# Patient Record
Sex: FEMALE | Race: WHITE | Employment: OTHER | URBAN - METROPOLITAN AREA
[De-identification: names, ages, dates, MRNs, and addresses within clinical notes are randomized per-mention and may not be internally consistent; named-entity substitution may affect disease eponyms.]

---

## 2020-03-20 ENCOUNTER — HOSPITAL ENCOUNTER (OUTPATIENT)
Dept: CT IMAGING | Age: 65
Discharge: HOME | End: 2020-03-20
Attending: INTERNAL MEDICINE
Payer: MEDICARE

## 2020-03-20 VITALS — DIASTOLIC BLOOD PRESSURE: 77 MMHG | SYSTOLIC BLOOD PRESSURE: 134 MMHG | HEART RATE: 62 BPM

## 2020-03-20 DIAGNOSIS — R07.9 CHEST PAIN, UNSPECIFIED: ICD-10-CM

## 2020-03-20 DIAGNOSIS — R06.09 DYSPNEA ON EXERTION: ICD-10-CM

## 2020-03-20 LAB — CREAT BLD-MCNC: 0.8 MG/DL (ref 0.6–1.3)

## 2020-03-20 PROCEDURE — 74011250637 HC RX REV CODE- 250/637: Performed by: INTERNAL MEDICINE

## 2020-03-20 PROCEDURE — 82565 ASSAY OF CREATININE: CPT

## 2020-03-20 PROCEDURE — 74011636320 HC RX REV CODE- 636/320: Performed by: INTERNAL MEDICINE

## 2020-03-20 PROCEDURE — 74011250636 HC RX REV CODE- 250/636: Performed by: INTERNAL MEDICINE

## 2020-03-20 PROCEDURE — 75574 CT ANGIO HRT W/3D IMAGE: CPT

## 2020-03-20 RX ORDER — NITROGLYCERIN 0.4 MG/1
0.4 TABLET SUBLINGUAL AS NEEDED
Status: DISCONTINUED | OUTPATIENT
Start: 2020-03-20 | End: 2020-03-24 | Stop reason: HOSPADM

## 2020-03-20 RX ORDER — SODIUM CHLORIDE 9 MG/ML
50 INJECTION, SOLUTION INTRAVENOUS ONCE
Status: COMPLETED | OUTPATIENT
Start: 2020-03-20 | End: 2020-03-20

## 2020-03-20 RX ORDER — SODIUM CHLORIDE 0.9 % (FLUSH) 0.9 %
10 SYRINGE (ML) INJECTION ONCE
Status: COMPLETED | OUTPATIENT
Start: 2020-03-20 | End: 2020-03-20

## 2020-03-20 RX ORDER — IODIXANOL 320 MG/ML
100 INJECTION, SOLUTION INTRAVASCULAR ONCE
Status: COMPLETED | OUTPATIENT
Start: 2020-03-20 | End: 2020-03-20

## 2020-03-20 RX ADMIN — IODIXANOL 100 ML: 320 INJECTION, SOLUTION INTRAVASCULAR at 08:23

## 2020-03-20 RX ADMIN — SODIUM CHLORIDE 250 ML: 900 INJECTION, SOLUTION INTRAVENOUS at 08:23

## 2020-03-20 RX ADMIN — NITROGLYCERIN 0.4 MG: 0.4 TABLET SUBLINGUAL at 08:12

## 2020-03-20 RX ADMIN — Medication 10 ML: at 08:23

## 2020-03-20 NOTE — PROGRESS NOTES
0800- Pt to CT room. IV SL established by Olimpia Valdez, RT. Pt tolerated well. Pt took beta blocker PTA. HR-64    0805- CT scan begins. 3567- NTG given. IV patent. No complaints. HR-60    0820- CT scan complete. Pt tolerated procedure well. 0825- IV D/C'd intact without problem. D/C instructions reviewed with pt and copy given. Verbalized understanding. D/C'd amb, stable, NAD.

## 2020-04-03 NOTE — PROCEDURES
[address] CATH    Name:  Tiffani Vasques  MR#:  [id number]  :  1955  ACCOUNT #:  [de-identified]  DATE OF SERVICE:  2020    PROCEDURES PERFORMED:  Coronary CTA. The extracardiac portion of the test is dictated separately by Radiology Services. CORONARY ANATOMY:  1. The left main coronary artery rises normally from sinus of Valsalva. This is no significant atherosclerotic disease in the left main. 2.  The LAD is a moderate-sized vessel, which extends around the apex. The LAD gives off a small-to-moderate size diagonal branch followed by a small diagonal branch with no significant atherosclerotic disease seen in the LAD system. 3.  The circumflex is a moderate-sized nondominant vessel, which gives off the moderate-to-large size obtuse marginal branch and the distal circumflex gives off a small-to-moderate size posterolateral branch. There is 40% atherosclerotic disease in the obtuse marginal branch, noncalcified. 4.  The right coronary artery is a small-to-moderate sized dominant vessel, which gives off a small posterolateral branch and moderate size PDA branch. There is no significant atherosclerotic disease seen in the right coronary artery system. LEFT VENTRICULAR ANALYSIS:  The left ventricle is normal in size. The left ventricular myocardium appeared normal.  There were no regional wall motion abnormalities. Left ventricular ejection fraction is calculated to be 64%. CONCLUSION:  1. Nonsignificant atherosclerotic disease seen in the circumflex system, noncalcified. 2.  Right coronary artery dominant system. 3.  Normal left ventricular analysis with no regional wall motion abnormalities and a left ventricular ejection fraction is calculated to be 64%.         Tres Marsh MD      [id number]  D:  2020 20:25  T:  2020 21:08  JOB #:  [id number]  CC:  Elie Scott MD

## 2021-02-19 ENCOUNTER — HOSPITAL ENCOUNTER (OUTPATIENT)
Dept: LAB | Age: 66
Discharge: HOME | End: 2021-02-19
Payer: MEDICARE

## 2021-02-19 ENCOUNTER — TRANSCRIBE ORDER (OUTPATIENT)
Dept: REGISTRATION | Age: 66
End: 2021-02-19

## 2021-02-19 DIAGNOSIS — Z01.812 PRE-PROCEDURAL LABORATORY EXAMINATIONS: Primary | ICD-10-CM

## 2021-02-19 DIAGNOSIS — Z01.812 PRE-PROCEDURAL LABORATORY EXAMINATIONS: ICD-10-CM

## 2021-02-19 PROCEDURE — U0003 INFECTIOUS AGENT DETECTION BY NUCLEIC ACID (DNA OR RNA); SEVERE ACUTE RESPIRATORY SYNDROME CORONAVIRUS 2 (SARS-COV-2) (CORONAVIRUS DISEASE [COVID-19]), AMPLIFIED PROBE TECHNIQUE, MAKING USE OF HIGH THROUGHPUT TECHNOLOGIES AS DESCRIBED BY CMS-2020-01-R: HCPCS

## 2021-02-20 LAB — SARS-COV-2, COV2NT: NOT DETECTED

## 2021-02-22 RX ORDER — MELATONIN
1000 DAILY
COMMUNITY

## 2021-02-22 RX ORDER — HYDROXYZINE 25 MG/1
25 TABLET, FILM COATED ORAL
COMMUNITY

## 2021-02-22 RX ORDER — GLUCOSAM/CHONDRO/HERB 149/HYAL 750-100 MG
1 TABLET ORAL DAILY
Status: ON HOLD | COMMUNITY
End: 2021-09-23

## 2021-02-22 RX ORDER — ASCORBIC ACID 500 MG
1000 TABLET ORAL DAILY
COMMUNITY

## 2021-02-22 NOTE — PERIOP NOTES
[address]  Endoscopy Preprocedure Instructions      1. On the day of your surgery, please report to registration located on the 2nd floor of the  [facility]. yes    2. You must have a responsible adult to drive you to the hospital, stay at the hospital during your procedure and drive you home. If they leave your procedure will not be started (no exceptions). yes    3. Do not have anything to eat or drink (including water, gum, mints, coffee, and juice) after midnight. This does not apply to the medications you were instructed to take by your physician.yes. If you are currently taking Plavix, Coumadin, Aspirin, or other blood-thinning agents, contact your physician for special instructions. not applicable. 4. If you are having a procedure that requires bowel prep: We recommend that you have only clear liquids the day before your procedure and begin your bowel prep by 5:00 pm.  You may continue to drink clear liquids until midnight. If for any reason you are not able to complete your prep please notify your physician immediately. yes    5. Have a list of all current medications, including vitamins, herbal supplements and any other over the counter medications. yes    6. If you wear glasses, contacts, dentures and/or hearing aids, they may be removed prior to procedure, please bring a case to store them in. yes    7. You should understand that if you do not follow these instructions your procedure may be cancelled. If your physical condition changes (I.e. fever, cold or flu) please contact your doctor as soon as possible. 8. It is important that you be on time.   If for any reason you are unable to keep your appointment please call [phone] the day of or your physicians office prior to your scheduled procedure

## 2021-02-23 ENCOUNTER — HOSPITAL ENCOUNTER (OUTPATIENT)
Age: 66
Setting detail: OUTPATIENT SURGERY
Discharge: HOME | End: 2021-02-23
Attending: SPECIALIST | Admitting: SPECIALIST
Payer: MEDICARE

## 2021-02-23 ENCOUNTER — ANESTHESIA (OUTPATIENT)
Dept: ENDOSCOPY | Age: 66
End: 2021-02-23
Payer: MEDICARE

## 2021-02-23 ENCOUNTER — ANESTHESIA EVENT (OUTPATIENT)
Dept: ENDOSCOPY | Age: 66
End: 2021-02-23
Payer: MEDICARE

## 2021-02-23 VITALS
BODY MASS INDEX: 22 KG/M2 | RESPIRATION RATE: 17 BRPM | WEIGHT: 136.91 LBS | HEART RATE: 63 BPM | OXYGEN SATURATION: 96 % | DIASTOLIC BLOOD PRESSURE: 63 MMHG | TEMPERATURE: 97.5 F | HEIGHT: 66 IN | SYSTOLIC BLOOD PRESSURE: 111 MMHG

## 2021-02-23 PROCEDURE — 76040000019: Performed by: SPECIALIST

## 2021-02-23 PROCEDURE — 2709999900 HC NON-CHARGEABLE SUPPLY: Performed by: SPECIALIST

## 2021-02-23 PROCEDURE — 74011250636 HC RX REV CODE- 250/636: Performed by: NURSE ANESTHETIST, CERTIFIED REGISTERED

## 2021-02-23 PROCEDURE — 88305 TISSUE EXAM BY PATHOLOGIST: CPT

## 2021-02-23 PROCEDURE — 76060000031 HC ANESTHESIA FIRST 0.5 HR: Performed by: SPECIALIST

## 2021-02-23 PROCEDURE — 77030021593 HC FCPS BIOP ENDOSC BSC -A: Performed by: SPECIALIST

## 2021-02-23 PROCEDURE — 77030013992 HC SNR POLYP ENDOSC BSC -B: Performed by: SPECIALIST

## 2021-02-23 RX ORDER — PROPOFOL 10 MG/ML
INJECTION, EMULSION INTRAVENOUS AS NEEDED
Status: DISCONTINUED | OUTPATIENT
Start: 2021-02-23 | End: 2021-02-23 | Stop reason: HOSPADM

## 2021-02-23 RX ORDER — SODIUM CHLORIDE 9 MG/ML
INJECTION, SOLUTION INTRAVENOUS
Status: DISCONTINUED | OUTPATIENT
Start: 2021-02-23 | End: 2021-02-23 | Stop reason: HOSPADM

## 2021-02-23 RX ORDER — MIDAZOLAM HYDROCHLORIDE 1 MG/ML
.25-5 INJECTION, SOLUTION INTRAMUSCULAR; INTRAVENOUS AS NEEDED
Status: DISCONTINUED | OUTPATIENT
Start: 2021-02-23 | End: 2021-02-23 | Stop reason: HOSPADM

## 2021-02-23 RX ORDER — FENTANYL CITRATE 50 UG/ML
25 INJECTION, SOLUTION INTRAMUSCULAR; INTRAVENOUS AS NEEDED
Status: DISCONTINUED | OUTPATIENT
Start: 2021-02-23 | End: 2021-02-23 | Stop reason: HOSPADM

## 2021-02-23 RX ORDER — DEXTROMETHORPHAN/PSEUDOEPHED 2.5-7.5/.8
1.2 DROPS ORAL
Status: DISCONTINUED | OUTPATIENT
Start: 2021-02-23 | End: 2021-02-23 | Stop reason: HOSPADM

## 2021-02-23 RX ORDER — NALOXONE HYDROCHLORIDE 0.4 MG/ML
0.4 INJECTION, SOLUTION INTRAMUSCULAR; INTRAVENOUS; SUBCUTANEOUS
Status: DISCONTINUED | OUTPATIENT
Start: 2021-02-23 | End: 2021-02-23 | Stop reason: HOSPADM

## 2021-02-23 RX ORDER — FLUMAZENIL 0.1 MG/ML
0.2 INJECTION INTRAVENOUS
Status: DISCONTINUED | OUTPATIENT
Start: 2021-02-23 | End: 2021-02-23 | Stop reason: HOSPADM

## 2021-02-23 RX ORDER — SODIUM CHLORIDE 9 MG/ML
50 INJECTION, SOLUTION INTRAVENOUS CONTINUOUS
Status: DISCONTINUED | OUTPATIENT
Start: 2021-02-23 | End: 2021-02-23 | Stop reason: HOSPADM

## 2021-02-23 RX ADMIN — PROPOFOL 50 MG: 10 INJECTION, EMULSION INTRAVENOUS at 07:33

## 2021-02-23 RX ADMIN — PROPOFOL 50 MG: 10 INJECTION, EMULSION INTRAVENOUS at 07:39

## 2021-02-23 RX ADMIN — PROPOFOL 80 MG: 10 INJECTION, EMULSION INTRAVENOUS at 07:28

## 2021-02-23 RX ADMIN — SODIUM CHLORIDE: 9 INJECTION, SOLUTION INTRAVENOUS at 07:21

## 2021-02-23 NOTE — PROGRESS NOTES
Milka Mccullough  1955  [id number]    Situation:  Verbal report received from:    Elmira Wilkins RN   Procedure: Procedure(s):  COLONOSCOPY  COLON BIOPSY  POLYPECTOMY    Background:    Preoperative diagnosis: RECTAL HEMORRHAGE  SCREENING FOR MALIGNANT NEOPLASMS OF COLON  Postoperative diagnosis: 1. rectal mass  2. rectal polyp    :  Dr. Elizabeth Carrizales   Assistant(s): Endoscopy Technician-1: Howie Pritchard  Endoscopy RN-1: Kahlil Avelar    Specimens:   ID Type Source Tests Collected by Time Destination   1 : Polyp Preservative Rectum  Gera Craig MD 2/23/2021 4864 Pathology   2 : Rectal Mass Biopsy Preservative   Gera Craig MD 2/23/2021 9255 Pathology     H. Pylori  no    Assessment:  Intra-procedure medications       Anesthesia gave intra-procedure sedation and medications, see anesthesia flow sheet yes    Intravenous fluids: NS@ KVO     Vital signs stable   yes    Abdominal assessment: round and soft   yes    Recommendation:  Discharge patient per MD order  yes.   Return to floor  outpatient  Family or Friend   Daughter   Permission to share finding with family or friend yes

## 2021-02-23 NOTE — PROGRESS NOTES
Anesthesia staff at patient's bedside administering anesthesia and monitoring patients vital signs throughout procedure. See anesthesia note. ABD remains soft and non-tender post procedure. Pt has no complaints at this time and tolerated the procedure well. Endoscope was pre-cleaned at bedside immediately following procedure by Kelly Roy.

## 2021-02-23 NOTE — INTERVAL H&P NOTE
Pre-Endoscopy H&P Update  Chief complaint/HPI/ROS:  The indication for the procedure, the patient's history and the patient's current medications are reviewed prior to the procedure and that data is reported on the H&P to which this document is attached. Any significant complaints with regard to organ systems will be noted, and if not mentioned then a review of systems is not contributory. Past Medical History:   Diagnosis Date    Atrial fibrillation ([address]     recently diagnosed in 2021. Ill-defined condition     rectal bleeding. Thromboembolus ([address]     PE in 1999. Past Surgical History:   Procedure Laterality Date    VT BREAST SURGERY PROCEDURE UNLISTED      breast biopsy. Social   Social History     Tobacco Use    Smoking status: Never Smoker    Smokeless tobacco: Never Used   Substance Use Topics    Alcohol use: Never     Frequency: Never      Family History   Problem Relation Age of Onset    Cancer Paternal Uncle     Cancer Mother     Cancer Father       No Known Allergies   Prior to Admission Medications   Prescriptions Last Dose Informant Patient Reported? Taking? OTHER 2/16/2021  Yes Yes   Sig: Take 1 Cap by mouth. Coconut supplement. apixaban (Eliquis) 5 mg tablet 2/6/2021 at Unknown time  Yes No   Sig: Take 5 mg by mouth two (2) times a day. ascorbic acid, vitamin C, (Vitamin C) 500 mg tablet 2/16/2021  Yes Yes   Sig: Take 1,000 mg by mouth daily. cholecalciferol (Vitamin D3) (1000 Units /25 mcg) tablet 2/16/2021  Yes Yes   Sig: Take 1,000 Units by mouth daily. hydrOXYzine HCL (ATARAX) 25 mg tablet Unknown at Unknown time  Yes No   Sig: Take 25 mg by mouth three (3) times daily as needed. omega 3-DHA-EPA-fish oil 1,000 mg (120 mg-180 mg) capsule 2/16/2021  Yes Yes   Sig: Take 1 Cap by mouth daily. Facility-Administered Medications: None       PHYSICAL EXAM:  The patient is examined immediately prior to the procedure.   Visit Vitals  BP (!) 147/75   Pulse 66   Temp 98.3 °F (36.8 °C)   Resp 18   Ht 5' 6\" (1.676 m)   Wt 62.1 kg (136 lb 14.5 oz)   SpO2 99%   Breastfeeding No   BMI 22.10 kg/m²     Gen: Appears comfortable, no distress. Pulm: comfortable respirations with no abnormal audible breath sounds  HEART: well perfused, no abnormal audible heart sounds  GI: abdomen flat. PLAN:  Informed consent discussion held, patient afforded an opportunity to ask questions and all questions answered. After being advised of the risks, benefits, and alternatives, the patient requested that we proceed and indicated so on a written consent form. Will proceed with procedure as planned.   Jami Hernandez MD

## 2021-02-23 NOTE — ANESTHESIA POSTPROCEDURE EVALUATION
Procedure(s):  COLONOSCOPY  COLON BIOPSY  POLYPECTOMY. MAC    Anesthesia Post Evaluation      Multimodal analgesia: multimodal analgesia used between 6 hours prior to anesthesia start to PACU discharge  Patient location during evaluation: PACU  Patient participation: complete - patient participated  Level of consciousness: awake and alert  Pain management: adequate  Airway patency: patent  Anesthetic complications: no  Cardiovascular status: acceptable  Respiratory status: acceptable  Hydration status: acceptable  Post anesthesia nausea and vomiting:  none      INITIAL Post-op Vital signs:   Vitals Value Taken Time   BP 88/50 02/23/21 0754   Temp     Pulse 66 02/23/21 0756   Resp 15 02/23/21 0756   SpO2 98 % 02/23/21 0756   Vitals shown include unvalidated device data.

## 2021-02-23 NOTE — PROGRESS NOTES
Endoscopy discharge instructions have been reviewed and given to patient. The patient verbalized understanding and acceptance of instructions. Dr. Julien Ruvalcaba  discussed with patient's daughter  procedure findings and next steps.

## 2021-02-23 NOTE — ANESTHESIA PREPROCEDURE EVALUATION
Relevant Problems   No relevant active problems       Anesthetic History   No history of anesthetic complications            Review of Systems / Medical History  Patient summary reviewed, nursing notes reviewed and pertinent labs reviewed    Pulmonary  Within defined limits            Pertinent negatives: No COPD, recent URI and sleep apnea     Neuro/Psych   Within defined limits           Cardiovascular            Dysrhythmias : atrial fibrillation    Pertinent negatives: No hypertension    Comments:  Worsened rectal bleeding on anticoagulation, so patient discontinued this    GI/Hepatic/Renal  Within defined limits              Endo/Other  Within defined limits        Pertinent negatives: No diabetes   Other Findings              Physical Exam    Airway  Mallampati: II  TM Distance: 4 - 6 cm  Neck ROM: normal range of motion   Mouth opening: Normal     Cardiovascular  Regular rate and rhythm,  S1 and S2 normal,  no murmur, click, rub, or gallop             Dental    Dentition: Lower dentition intact and Upper dentition intact     Pulmonary  Breath sounds clear to auscultation               Abdominal         Other Findings            Anesthetic Plan    ASA: 2  Anesthesia type: MAC          Induction: Intravenous  Anesthetic plan and risks discussed with: Patient

## 2021-02-23 NOTE — PROCEDURES
[address] [facility] RHONDA Agarwal MD  [phone]      2021    Colonoscopy Procedure Note  Betty Gonzalez  :  1955  [facility] Medical Record Number: [id number]    Indications:     Hematochezia/melena, Screening colonoscopy  PCP:  Julianna Soto MD  Anesthesia/Sedation: Conscious Sedation/Moderate Sedation/GETA, see notes  Endoscopist:  Dr. Jill Cannon  Complications:  None  Estimated Blood Loss:  None    Permit:  The indications, risks, benefits and alternatives were reviewed with the patient or their decision maker who was provided an opportunity to ask questions and all questions were answered. The specific risks of colonoscopy with conscious sedation were reviewed, including but not limited to anesthetic complication, bleeding, adverse drug reaction, missed lesion, infection, IV site reactions, and intestinal perforation which would lead to the need for surgical repair. Alternatives to colonoscopy including radiographic imaging, observation without testing, or laboratory testing were reviewed including the limitations of those alternatives. After considering the options and having all their questions answered, the patient or their decision maker provided both verbal and written consent to proceed. Procedure in Detail:  After obtaining informed consent, positioning of the patient in the left lateral decubitus position, and conduction of a pre-procedure pause or \"time out\" the endoscope was introduced into the anus and advanced to the cecum, which was identified by the ileocecal valve and appendiceal orifice. The quality of the colonic preparation was excellent. A careful inspection was made as the colonoscope was withdrawn, findings and interventions are described below.     Findings:   Palpable rectal mass on digital exam.    Endoscopic exam reveals annular 3cm villiform malignant appearing rectal mass with depressed center which is firm and 'feels' fixed to the rectal wall; located in the rectum from 5-8cm upon the first valve of Alexander. Multiple cold forceps biopsies obtained. Adjacent to the mass is a small 5mm polyp which appears separate from the above mentioned lesion. This is snared off cold (and this is done prior to biopsies so as to try to minimize the likelihood of cross-contamination). Hemostasis from biopsies and polypectomy site confirmed. Specimens:    See above    Complications:   None; patient tolerated the procedure well. Impression:  Rectal polyp and rectal carcinoma. LABS have already been done: CBC is normal hgb 14, CMP reveals eGFR >60, normal liver enzymes and liver function. Recommendations:     - Await pathology. - CT scan will be obtained    - Colorectal surgery consult will be requested. - Medical oncology consult will be requested. - Radial EUS will be requested for local staging information and assistance in planning surgery vs ne-adjuvant therapy. - She is in sinus rhythm today, and given the finding and her history of intermittent bleeding even without anticoagulation I would recommend that be held until such time that her therapeutic plans are generated. Thank you for entrusting me with this patient's care. Please do not hesitate to contact me with any questions or if I can be of assistance with any of your other patients' GI needs. Signed By: Angelica Greenberg MD                        February 23, 2021      Surgical assistant none. Implants none unless specified.

## 2021-02-23 NOTE — H&P
Date: 2021 8:45 AM   Patient Name: José Miguel Escamilla   Account #: [id number]    Gender: Female    (age): 1955 (76)       Provider:     Harlan Way MD        Referring Physician:     Jacquelyn Hair  [facility], [address] [facility] [address]  [phone] (phone)  [phone] (fax)        Chief Complaint: Blood in stool, nausea, Severe Fatigue           History of Present Illness:   Personal history of visible rectal bleeding likely more than a years duration, intermittent. Personal history of syncopal episode several weeks ago; went to the emergency room where head, chest CT scans were without diagnosis; troponin I was normal as was CBC, CMP. Follow-up at cardiology was told atrial fibrillation was a prime suspect, placed on heart monitor, placed on Eliquis; Eliquis markedly increased the amount and frequency of bleeding; she stopped that medication 2 weeks ago. Bleeding has persisted in spite of stopping Eliquis. She has persistent intermittent episodes of dizziness, weakness. Have discussed atrial fibrillation, reluctance to resume Eliquis anticoagulation without knowledge as to the origin of the blood loss; advised of persistent atrial fibrillation has a significant CVA risk. All questions answered. ? Personal history of visible rectal bleeding likely more than a years duration, intermittent. Personal history of syncopal episode several weeks ago; went to the emergency room where head, chest CT scans were without diagnosis; troponin I was normal as was CBC, CMP. Follow-up at cardiology was told atrial fibrillation was a prime suspect, placed on heart monitor, placed on Eliquis; Eliquis markedly increased the amount and frequency of bleeding; she stopped that medication 2 weeks ago. Bleeding has persisted in spite of stopping Eliquis. She has persistent intermittent episodes of dizziness, weakness.  Have discussed atrial fibrillation, reluctance to resume Eliquis anticoagulation without knowledge as to the origin of the blood loss; advised of persistent atrial fibrillation has a significant CVA risk. All questions answered. ? Past Medical History      Medical Conditions: No Known Conditions   Surgical Procedures: No Prior Procedures   Dx Studies: CT Scan, Coronary  Pre-Procedure Call, 11/28/2016   Medications: ascorbic acid (vitamin C) 500 mg Take 1 tablet by mouth once a day  cholecalciferol (vitamin D3) 10 mcg (400 unit) Take 1 tablet by mouth once a day  Fish Oil 120-180 mg Take 1 capsule by mouth once a day   Allergies: Patient has no known allergies or drug allergies   Immunizations: Pneumococcal conjugate PCV 13, 01/01/2020  Influenza, seasonal, injectable (refused)      Social History      Alcohol: None   Tobacco: Never smoker   Drugs: None   Exercise: Exercise less than 3 times a week. Caffeine: Daily. Marital Status:          Occupation:               Family History No history of Esophogeal Cancer, GI Cancers, IBD (Crohn's or UC), Liver disease  Uncle: Diagnosed with Family history of colon cancer;   Sister: Diagnosed with Breast, Family history of colon polyps;   Brother:   Mother: Diagnosed with Pancreatic cancer;       Review of Systems:   Cardiovascular: Denies chest pain, irregular heart beat, palpitations, peripheral edema, syncope, Sweats. Constitutional: Presents suffers from fatigue. Denies fever, loss of appetite, weight gain, weight loss. ENMT: Denies nose bleeds, sore throat, hearing loss. Endocrine: Denies excessive thirst, heat intolerance. Eyes: Denies loss of vision. Gastrointestinal: Presents suffers from constipation, nausea, rectal bleeding. Denies abdominal pain, abdominal swelling, change in bowel habits, diarrhea, Bloating/gas, heartburn, jaundice, stomach cramps, vomiting, dysphagia, rectal pain, Stool incontinence, hematemesis. Genitourinary: Denies dark urine, dysuria, frequent urination, hematuria, incontinence.    Hematologic/Lymphatic: Presents suffers from easy bruising. Denies prolonged bleeding. Integumentary: Denies itching, rashes, sun sensitivity. Musculoskeletal: Denies arthritis, back pain, gout, joint pain, muscle weakness, stiffness. Neurological: Denies dizziness, fainting, frequent headaches, memory loss. Psychiatric: Presents suffers from difficulty sleeping. Denies anxiety, depression, hallucinations, nervousness, panic attacks, paranoia. Respiratory: Denies cough, dyspnea, wheezing. Vital Signs:   BP  (mmHg)  Pulse  (ppm) Weight (lbs/oz) Height (ft/in) BMI   130/86 77 140 /  5 / 6 22.59      Physical Exam:   Constitutional:      Appearance: No distress, appears comfortable. Skin:      Inspection: No rash, no jaundice. Head/face: Inspection: Normacephalic, atraumatic. Eyes:      Conjunctivae/lids: Normal.   ENMT:      External: Normal.   Neck:      Neck: Normal appearance, trachea midline. Respiratory:      Effort: Normal respiratory effort, comfortable, speaks in complete sentences. Auscultation: normal breath sounds, no rubs, wheezes or rhonchi. Cardiovascular: Auscultation: normal, S1 and S2, no gallops , no rubs or murmurs . Gastrointestinal/Abdomen:      Abdomen: non-distended, nontender. Liver/Spleen: normal, normal size, Liver size and consistency normal, spleen is non-palpable. Musculoskeletal:      Gait/station: normal.   Muscles: normal strength and tone, no atrophy or abnormal movements. Psychiatric:      Judgment/insight: Normal, normal judgement, normal insight. Mood and affect: Normal mood, affect full, no evidence of depression, anxiety or agitation. Lymphatic:      Neck: No lymphadenopathy in the cervical or supraclavicular chain. Lab Results: No Electronic Results      Impressions: Rectal hemorrhage? ? Rectal hemorrhage?          Assessment: ?         Plan: Suprep Bowel Prep Kit 17.5-3.13-1.6 gram Take as directed  Colonoscopy  Colonoscopy/Biopsy/Polypectomy: options, risks, benefits and complications of bleeding, tear, surgical repair (1:1000) & 1:100 post Polypectomy, and reaction of medication, aspiration, Pneumonia explained to patient, 5% chance of missing colon cancer and other lesions explained. All questions answered. Education on Colonoscopy Prep? ?Suprep Bowel Prep Kit? ?17.5-3.13-1.6 gram? ?Take as directed? ? ?  ? ? Colonoscopy? ?  ? ? Colonoscopy/Biopsy/Polypectomy: options, risks, benefits and complications of bleeding, tear, surgical repair (1:1000) & 1:100 post Polypectomy, and reaction of medication, aspiration, Pneumonia explained to patient, 5% chance of missing colon cancer and other lesions explained. All questions answered. ? ?  ? ? Education on Colonoscopy Prep? Risk & Medical Necessity: The level of medical decision making for this visit is moderate. The number and complexity of problems addressed are moderate. The amount and/or complexity of data to be reviewed and analyzed is moderate. The risk of complications and/or morbidity or mortality of patient management is low. Notes:              Ana Patel MD     Electronically signed on 2021 9:57:40 AM by MD Aye Oropeza, MRN [id number],  1955 First Visit, 2021                                                                                                                                                        New     Modify          Delete     Delete all     Edit Wording          Sign     [email or web address] No

## 2021-02-23 NOTE — DISCHARGE INSTRUCTIONS
[address] [facility] RHONDA Harris MD  [phone]      February 23, 2021    [address]: 1955    COLONOSCOPY DISCHARGE INSTRUCTIONS    If there is redness at IV site you should apply warm compress to area. If redness or soreness persist contact Dr. Lena Harris' or your primary care doctor. There may be a slight amount of blood passed from the rectum. Gaseous discomfort may develop, but walking, belching will help relieve this. You may not operate a vehicle for 12 hours  You may not operate machinery or dangerous appliances for rest of today  You may not drink alcoholic beverages for 12 hours  Avoid making any critical decisions for 24 hours    DIET:  You may resume your normal diet, but some patients find that heavy or large meals may lead to indigestion or vomiting. I suggest a light meal as first food intake. MEDICATIONS:  The use of some over-the-counter pain medication may lead to bleeding after colon biopsies or polyp removal.  Tylenol (also called acetaminophen) is safe to take even if you have had colonoscopy with polyp removal.  Based on the procedure you had today you may not safely take aspirin or aspirin-like products for the next ten (10) days. Remember that Tylenol (also called acetaminophen) is safe to take after colonoscopy even if you have had biopsies or polyps removed. ACTIVITY:  You may resume your normal household activities, but it is recommended that you spend the remainder of the day resting -  avoid any strenuous activity. CALL DR. Ender Rivera' OFFICE IF:  Increasing pain, nausea, vomiting  Abdominal distension (swelling)  Significant new or increased bleeding (oral or rectal)  Fever/Chills  Chest pain/shortness of breath                       Additional instructions:   No aspirin 10 days. Do not restart anticoagulation until advised to do so by your other doctors.   We discovered what appears to be a cancer in the rectum. I have taken multiple biopsies and will contact you with results. While waiting for results we need to begin additional testing and arrange consultation with an oncologist and a surgeon so that treatment options can be generated. My office will contact you with the scheduling of a CT scan, and endoscopic ultrasound test, and the consults with the additional physicians who will help to determine what to do next about this finding. It was an honor to be your doctor today. Please let me or my office staff know if you have any feedback about today's procedure. Lucie Matias MD    Colonoscopy saves lives, and can prevent colon cancer. Everyone aged 48 or older needs colonoscopy.   Tell your family and friends: get the test!

## 2021-02-24 ENCOUNTER — TRANSCRIBE ORDER (OUTPATIENT)
Dept: SCHEDULING | Age: 66
End: 2021-02-24

## 2021-02-24 DIAGNOSIS — C20 MALIGNANT TUMOR OF RECTUM (HCC): Primary | ICD-10-CM

## 2021-03-01 ENCOUNTER — HOSPITAL ENCOUNTER (OUTPATIENT)
Dept: CT IMAGING | Age: 66
Discharge: HOME | End: 2021-03-01
Attending: SPECIALIST
Payer: MEDICARE

## 2021-03-01 DIAGNOSIS — C20 MALIGNANT TUMOR OF RECTUM (HCC): ICD-10-CM

## 2021-03-01 LAB — CREAT BLD-MCNC: 0.6 MG/DL (ref 0.6–1.3)

## 2021-03-01 PROCEDURE — 74011000636 HC RX REV CODE- 636

## 2021-03-01 PROCEDURE — 82565 ASSAY OF CREATININE: CPT

## 2021-03-01 PROCEDURE — 74177 CT ABD & PELVIS W/CONTRAST: CPT

## 2021-03-01 RX ADMIN — IOPAMIDOL 100 ML: 755 INJECTION, SOLUTION INTRAVENOUS at 15:18

## 2021-09-20 ENCOUNTER — HOSPITAL ENCOUNTER (INPATIENT)
Age: 66
LOS: 3 days | Discharge: HOME HEALTH | DRG: 552 | End: 2021-09-23
Attending: EMERGENCY MEDICINE | Admitting: GENERAL ACUTE CARE HOSPITAL
Payer: MEDICARE

## 2021-09-20 ENCOUNTER — APPOINTMENT (OUTPATIENT)
Dept: MRI IMAGING | Age: 66
DRG: 552 | End: 2021-09-20
Attending: EMERGENCY MEDICINE
Payer: MEDICARE

## 2021-09-20 ENCOUNTER — APPOINTMENT (OUTPATIENT)
Dept: GENERAL RADIOLOGY | Age: 66
DRG: 552 | End: 2021-09-20
Attending: EMERGENCY MEDICINE
Payer: MEDICARE

## 2021-09-20 DIAGNOSIS — M54.9 INTRACTABLE BACK PAIN: Primary | ICD-10-CM

## 2021-09-20 LAB
ANION GAP SERPL CALC-SCNC: 6 MMOL/L (ref 5–15)
BASOPHILS # BLD: 0 K/UL (ref 0–0.1)
BASOPHILS NFR BLD: 0 % (ref 0–1)
BUN SERPL-MCNC: 22 MG/DL (ref 6–20)
BUN/CREAT SERPL: 46 (ref 12–20)
CALCIUM SERPL-MCNC: 8.2 MG/DL (ref 8.5–10.1)
CHLORIDE SERPL-SCNC: 103 MMOL/L (ref 97–108)
CO2 SERPL-SCNC: 25 MMOL/L (ref 21–32)
CREAT SERPL-MCNC: 0.48 MG/DL (ref 0.55–1.02)
DIFFERENTIAL METHOD BLD: ABNORMAL
EOSINOPHIL # BLD: 0 K/UL (ref 0–0.4)
EOSINOPHIL NFR BLD: 0 % (ref 0–7)
ERYTHROCYTE [DISTWIDTH] IN BLOOD BY AUTOMATED COUNT: 15.9 % (ref 11.5–14.5)
GLUCOSE SERPL-MCNC: 162 MG/DL (ref 65–100)
HCT VFR BLD AUTO: 35.2 % (ref 35–47)
HGB BLD-MCNC: 11.7 G/DL (ref 11.5–16)
IMM GRANULOCYTES # BLD AUTO: 0 K/UL (ref 0–0.04)
IMM GRANULOCYTES NFR BLD AUTO: 0 % (ref 0–0.5)
INR PPP: 1 (ref 0.9–1.1)
LYMPHOCYTES # BLD: 0.2 K/UL (ref 0.8–3.5)
LYMPHOCYTES NFR BLD: 16 % (ref 12–49)
MCH RBC QN AUTO: 28.3 PG (ref 26–34)
MCHC RBC AUTO-ENTMCNC: 33.2 G/DL (ref 30–36.5)
MCV RBC AUTO: 85 FL (ref 80–99)
MONOCYTES # BLD: 0 K/UL (ref 0–1)
MONOCYTES NFR BLD: 0 % (ref 5–13)
NEUTS SEG # BLD: 1.2 K/UL (ref 1.8–8)
NEUTS SEG NFR BLD: 84 % (ref 32–75)
NRBC # BLD: 0 K/UL (ref 0–0.01)
NRBC BLD-RTO: 0 PER 100 WBC
PLATELET # BLD AUTO: 98 K/UL (ref 150–400)
PMV BLD AUTO: 11.2 FL (ref 8.9–12.9)
POTASSIUM SERPL-SCNC: 4.4 MMOL/L (ref 3.5–5.1)
PROTHROMBIN TIME: 10.6 SEC (ref 9–11.1)
RBC # BLD AUTO: 4.14 M/UL (ref 3.8–5.2)
RBC MORPH BLD: ABNORMAL
SODIUM SERPL-SCNC: 134 MMOL/L (ref 136–145)
TOTAL CELLS COUNTED SPEC: 50
WBC # BLD AUTO: 1.4 K/UL (ref 3.6–11)
WBC MORPH BLD: ABNORMAL

## 2021-09-20 PROCEDURE — 96376 TX/PRO/DX INJ SAME DRUG ADON: CPT

## 2021-09-20 PROCEDURE — 36415 COLL VENOUS BLD VENIPUNCTURE: CPT

## 2021-09-20 PROCEDURE — 72170 X-RAY EXAM OF PELVIS: CPT

## 2021-09-20 PROCEDURE — 80048 BASIC METABOLIC PNL TOTAL CA: CPT

## 2021-09-20 PROCEDURE — 72148 MRI LUMBAR SPINE W/O DYE: CPT

## 2021-09-20 PROCEDURE — 96374 THER/PROPH/DIAG INJ IV PUSH: CPT

## 2021-09-20 PROCEDURE — 74011250636 HC RX REV CODE- 250/636: Performed by: EMERGENCY MEDICINE

## 2021-09-20 PROCEDURE — 65270000029 HC RM PRIVATE

## 2021-09-20 PROCEDURE — 96375 TX/PRO/DX INJ NEW DRUG ADDON: CPT

## 2021-09-20 PROCEDURE — 99283 EMERGENCY DEPT VISIT LOW MDM: CPT

## 2021-09-20 PROCEDURE — 77030038269 HC DRN EXT URIN PURWCK BARD -A

## 2021-09-20 PROCEDURE — 2709999900 HC NON-CHARGEABLE SUPPLY

## 2021-09-20 PROCEDURE — 74011250636 HC RX REV CODE- 250/636

## 2021-09-20 PROCEDURE — 85025 COMPLETE CBC W/AUTO DIFF WBC: CPT

## 2021-09-20 PROCEDURE — 85610 PROTHROMBIN TIME: CPT

## 2021-09-20 RX ORDER — HYDROMORPHONE HYDROCHLORIDE 1 MG/ML
1 INJECTION, SOLUTION INTRAMUSCULAR; INTRAVENOUS; SUBCUTANEOUS ONCE
Status: COMPLETED | OUTPATIENT
Start: 2021-09-20 | End: 2021-09-20

## 2021-09-20 RX ORDER — MELATONIN
1000 DAILY
Status: DISCONTINUED | OUTPATIENT
Start: 2021-09-21 | End: 2021-09-23 | Stop reason: HOSPADM

## 2021-09-20 RX ORDER — MORPHINE SULFATE 10 MG/ML
INJECTION, SOLUTION INTRAMUSCULAR; INTRAVENOUS
Status: COMPLETED
Start: 2021-09-20 | End: 2021-09-20

## 2021-09-20 RX ORDER — ONDANSETRON 4 MG/1
4 TABLET, ORALLY DISINTEGRATING ORAL
Status: DISCONTINUED | OUTPATIENT
Start: 2021-09-20 | End: 2021-09-23 | Stop reason: HOSPADM

## 2021-09-20 RX ORDER — HYDROMORPHONE HYDROCHLORIDE 1 MG/ML
1 INJECTION, SOLUTION INTRAMUSCULAR; INTRAVENOUS; SUBCUTANEOUS
Status: DISCONTINUED | OUTPATIENT
Start: 2021-09-20 | End: 2021-09-20

## 2021-09-20 RX ORDER — ENOXAPARIN SODIUM 100 MG/ML
40 INJECTION SUBCUTANEOUS DAILY
Status: DISCONTINUED | OUTPATIENT
Start: 2021-09-21 | End: 2021-09-23 | Stop reason: HOSPADM

## 2021-09-20 RX ORDER — OXYCODONE HCL 20 MG/1
20 TABLET, FILM COATED, EXTENDED RELEASE ORAL EVERY 12 HOURS
COMMUNITY

## 2021-09-20 RX ORDER — ONDANSETRON 2 MG/ML
4 INJECTION INTRAMUSCULAR; INTRAVENOUS
Status: DISCONTINUED | OUTPATIENT
Start: 2021-09-20 | End: 2021-09-23 | Stop reason: HOSPADM

## 2021-09-20 RX ORDER — MORPHINE SULFATE 10 MG/ML
6 INJECTION, SOLUTION INTRAMUSCULAR; INTRAVENOUS
Status: COMPLETED | OUTPATIENT
Start: 2021-09-20 | End: 2021-09-20

## 2021-09-20 RX ORDER — ACETAMINOPHEN 500 MG
1000 TABLET ORAL
COMMUNITY

## 2021-09-20 RX ORDER — ACETAMINOPHEN 650 MG/1
650 SUPPOSITORY RECTAL
Status: DISCONTINUED | OUTPATIENT
Start: 2021-09-20 | End: 2021-09-23 | Stop reason: HOSPADM

## 2021-09-20 RX ORDER — POLYETHYLENE GLYCOL 3350 17 G/17G
17 POWDER, FOR SOLUTION ORAL DAILY PRN
Status: DISCONTINUED | OUTPATIENT
Start: 2021-09-20 | End: 2021-09-23 | Stop reason: HOSPADM

## 2021-09-20 RX ORDER — ONDANSETRON 2 MG/ML
4 INJECTION INTRAMUSCULAR; INTRAVENOUS
Status: COMPLETED | OUTPATIENT
Start: 2021-09-20 | End: 2021-09-20

## 2021-09-20 RX ORDER — ACETAMINOPHEN 325 MG/1
650 TABLET ORAL
Status: DISCONTINUED | OUTPATIENT
Start: 2021-09-20 | End: 2021-09-23 | Stop reason: HOSPADM

## 2021-09-20 RX ORDER — GABAPENTIN 100 MG/1
200 CAPSULE ORAL 3 TIMES DAILY
Status: DISCONTINUED | OUTPATIENT
Start: 2021-09-20 | End: 2021-09-23 | Stop reason: HOSPADM

## 2021-09-20 RX ORDER — OXYCODONE HCL 10 MG/1
20 TABLET, FILM COATED, EXTENDED RELEASE ORAL EVERY 12 HOURS
Status: DISCONTINUED | OUTPATIENT
Start: 2021-09-20 | End: 2021-09-21

## 2021-09-20 RX ORDER — MIDODRINE HYDROCHLORIDE 5 MG/1
5 TABLET ORAL 2 TIMES DAILY
COMMUNITY

## 2021-09-20 RX ORDER — OXYCODONE HYDROCHLORIDE 5 MG/1
5 CAPSULE ORAL
COMMUNITY

## 2021-09-20 RX ORDER — MIDODRINE HYDROCHLORIDE 5 MG/1
5 TABLET ORAL 2 TIMES DAILY WITH MEALS
Status: DISCONTINUED | OUTPATIENT
Start: 2021-09-20 | End: 2021-09-23 | Stop reason: HOSPADM

## 2021-09-20 RX ORDER — HYDROXYZINE 25 MG/1
25 TABLET, FILM COATED ORAL
Status: DISCONTINUED | OUTPATIENT
Start: 2021-09-20 | End: 2021-09-23 | Stop reason: HOSPADM

## 2021-09-20 RX ORDER — MORPHINE SULFATE 2 MG/ML
2 INJECTION, SOLUTION INTRAMUSCULAR; INTRAVENOUS
Status: DISCONTINUED | OUTPATIENT
Start: 2021-09-20 | End: 2021-09-23 | Stop reason: HOSPADM

## 2021-09-20 RX ORDER — SODIUM CHLORIDE 0.9 % (FLUSH) 0.9 %
5-40 SYRINGE (ML) INJECTION AS NEEDED
Status: DISCONTINUED | OUTPATIENT
Start: 2021-09-20 | End: 2021-09-23 | Stop reason: HOSPADM

## 2021-09-20 RX ORDER — POTASSIUM CHLORIDE 20 MEQ/1
20 TABLET, EXTENDED RELEASE ORAL DAILY
COMMUNITY
End: 2021-09-23

## 2021-09-20 RX ORDER — OMEPRAZOLE 20 MG/1
20 CAPSULE, DELAYED RELEASE ORAL DAILY
COMMUNITY

## 2021-09-20 RX ORDER — SODIUM CHLORIDE 0.9 % (FLUSH) 0.9 %
5-40 SYRINGE (ML) INJECTION EVERY 8 HOURS
Status: DISCONTINUED | OUTPATIENT
Start: 2021-09-20 | End: 2021-09-23 | Stop reason: HOSPADM

## 2021-09-20 RX ORDER — ASCORBIC ACID 500 MG
1000 TABLET ORAL DAILY
Status: DISCONTINUED | OUTPATIENT
Start: 2021-09-21 | End: 2021-09-23 | Stop reason: HOSPADM

## 2021-09-20 RX ADMIN — HYDROMORPHONE HYDROCHLORIDE 1 MG: 1 INJECTION, SOLUTION INTRAMUSCULAR; INTRAVENOUS; SUBCUTANEOUS at 14:18

## 2021-09-20 RX ADMIN — MORPHINE SULFATE 6 MG: 10 INJECTION, SOLUTION INTRAMUSCULAR; INTRAVENOUS at 13:48

## 2021-09-20 RX ADMIN — MORPHINE SULFATE 6 MG: 10 INJECTION, SOLUTION INTRAMUSCULAR; INTRAVENOUS at 12:02

## 2021-09-20 RX ADMIN — ONDANSETRON 4 MG: 2 INJECTION INTRAMUSCULAR; INTRAVENOUS at 12:01

## 2021-09-20 RX ADMIN — METHYLPREDNISOLONE SODIUM SUCCINATE 125 MG: 125 INJECTION, POWDER, FOR SOLUTION INTRAMUSCULAR; INTRAVENOUS at 12:01

## 2021-09-20 NOTE — ED NOTES
Change of shift report with [address] RN to myself' repositioned the pt; pt turned side to side with minimal assist to get the purewick placed correctly; placed a pull sheet clean under pt to ; her daughter assist to get pt her up and bed and to set her up for her to eat.

## 2021-09-20 NOTE — ED NOTES
Bedside and Verbal shift change report given to Melchor Cruz RN (oncoming nurse) by Wilmar Briseno (offgoing nurse). Report included the following information SBAR, Kardex, ED Summary, [facility] and Recent Results.

## 2021-09-20 NOTE — ROUTINE PROCESS
TRANSFER - IN REPORT:    Verbal report received from KEN Limon(name) on Dre Myo  being received from Emergency Department(unit) for routine progression of care      Report consisted of patients Situation, Background, Assessment and   Recommendations(SBAR). Information from the following report(s) SBAR, Kardex, ED Summary, Intake/Output, MAR and Recent Results was reviewed with the receiving nurse. Opportunity for questions and clarification was provided. Assessment completed upon patients arrival to unit and care assumed.

## 2021-09-20 NOTE — ED PROVIDER NOTES
EMERGENCY DEPARTMENT HISTORY AND PHYSICAL EXAM      Date: 9/20/2021  Patient Name: Joann Pinto    History of Presenting Illness     Chief Complaint   Patient presents with    Back Pain     unable to bear weight since last night/this morning. Hx of spinal stenosis and compression fractures. Took oxy lat night. Receiving chemo for rectal cancer       History Provided By: Patient and Patient's Daughter    HPI: Joann Pinto, 78 y.o. female presents to the ED with history of rectal cancer, status post chemotherapy and radiation through [facility] at [facility], here with ongoing sacral and lumbar pain with inability to tolerate walking on the right leg this morning. Patient says she has been previously diagnosed with sacral insufficiency fractures, has orthopedic appointment in approximately 1 month and has been seen by supportive care palliative care physician at [facility] for pain control as of this past Friday. Today, she is unable to tolerate walking due to pain radiating down her right leg to the level of her knee. She does have pain on the left side but it is most prominent on the right side today. She has difficulty finding a comfortable position and the increase in OxyContin from 10 mg to 20 mg only made her more sleepy and did not help with her pain. She had historically been taking multiple NSAIDs but was advised by the palliative care physician to stop due to impact on renal function. Patient says she has had a little bit of dribbling of her urine but no urinary retention and she is not had any problem moving her bowels. She denies any new weakness of the legs. She has not had any fall. There are no other complaints, changes, or physical findings at this time. PCP: Luis Max MD    No current facility-administered medications on file prior to encounter.      Current Outpatient Medications on File Prior to Encounter   Medication Sig Dispense Refill    oxyCODONE ER (OxyCONTIN) 20 mg ER tablet Take 20 mg by mouth every twelve (12) hours.  oxyCODONE (OXYIR) 5 mg capsule Take 5 mg by mouth every four (4) hours as needed for Pain.  acetaminophen (Tylenol Extra Strength) 500 mg tablet Take 1,000 mg by mouth every six (6) hours as needed for Pain.  omeprazole (PRILOSEC) 20 mg capsule Take 20 mg by mouth daily.  potassium chloride (K-DUR, KLOR-CON) 20 mEq tablet Take 20 mEq by mouth daily.  midodrine HCl (MIDODRINE PO) Take  by mouth two (2) times a day.  hydrOXYzine HCL (ATARAX) 25 mg tablet Take 25 mg by mouth three (3) times daily as needed.  ascorbic acid, vitamin C, (Vitamin C) 500 mg tablet Take 1,000 mg by mouth daily.  cholecalciferol (Vitamin D3) (1000 Units /25 mcg) tablet Take 1,000 Units by mouth daily.  omega 3-DHA-EPA-fish oil 1,000 mg (120 mg-180 mg) capsule Take 1 Cap by mouth daily. (Patient not taking: Reported on 9/20/2021)      OTHER Take 1 Cap by mouth. Coconut supplement. (Patient not taking: Reported on 9/20/2021)      apixaban (Eliquis) 5 mg tablet Take 5 mg by mouth two (2) times a day. (Patient not taking: Reported on 9/20/2021)         Past History     Past Medical History:  Past Medical History:   Diagnosis Date    Atrial fibrillation ([address] [address] [address]     recently diagnosed in 2021.  Ill-defined condition     rectal bleeding.  Thromboembolus ([address]     PE in 1999. Past Surgical History:  Past Surgical History:   Procedure Laterality Date    COLONOSCOPY N/A 2/23/2021    COLONOSCOPY performed by Shea Jon MD at [facility] UNLISTED      breast biopsy.         Family History:  Family History   Problem Relation Age of Onset    Cancer Paternal Uncle     Cancer Mother     Cancer Father        Social History:  Social History     Tobacco Use    Smoking status: Never Smoker    Smokeless tobacco: Never Used   Substance Use Topics    Alcohol use: Never    Drug use: Never       Allergies:  No Known Allergies      Review of Systems   Review of Systems   Constitutional: Negative for activity change and appetite change. HENT: Negative. Respiratory: Negative. Cardiovascular: Negative. Gastrointestinal: Negative. Genitourinary: Negative for difficulty urinating. Musculoskeletal: Positive for back pain. Skin: Negative. Neurological: Negative for weakness and numbness. All other systems reviewed and are negative. Physical Exam   Physical Exam   Vital signs and nursing notes reviewed    CONSTITUTIONAL: Alert, in moderate distress; well-developed; well-nourished. HEAD:  Normocephalic, atraumatic  EYES: PERRL; EOM's intact. ENTM: Nose: no rhinorrhea; Throat: no erythema or exudate, mucous membranes moist  Neck:  Supple. trachea is midline. RESP: Chest clear, equal breath sounds. - W/R/R  CV: S1 and S2 WNL; No murmurs, gallops or rubs. 2+ radial and DP pulses bilaterally. GI: non-distended, normal bowel sounds, abdomen soft and non-tender. No masses or organomegaly. : No costo-vertebral angle tenderness. BACK:  Non-tender, normal appearance, no midline spine pain, tenderness on the posterior iliac crest bilaterally, right greater than left without crepitus. UPPER EXT:  Normal inspection. no joint or soft tissue swelling  LOWER EXT: No edema, no calf tenderness. There is no shortening or rotation of the legs the patient has increased comfort with external rotation and flexion at the hip and knee. NEURO: Alert and oriented x3, 5/5 strength and light touch sensation intact in bilateral upper and lower extremities. SKIN: No rashes; Warm and dry  PSYCH: Normal mood, normal affect    Diagnostic Study Results     Labs -   No results found for this or any previous visit (from the past 12 hour(s)).     Radiologic Studies -   XR PELV AP ONLY   Final Result   No acute abnormality                   MRI LUMB SPINE WO CONT    (Results Pending)     CT Results (Last 48 hours)    None        CXR Results  (Last 48 hours)    None          Medical Decision Making   I am the first provider for this patient. I reviewed the vital signs, available nursing notes, past medical history, past surgical history, family history and social history. Vital Signs-Reviewed the patient's vital signs. Patient Vitals for the past 12 hrs:   Temp Pulse Resp BP SpO2   09/20/21 1050 98 °F (36.7 °C) 78 17 139/85 96 %         Records Reviewed: Nursing Notes and Old Medical Records    Provider Notes (Medical Decision Making):   74-year-old female with poorly controlled pain, likely secondary to nerve compression and/or fractures. Given the acute worsening of her pain, it is unclear if there is a new fracture, possibly brought on by recent radiation, discussed with pain physician and agrees for repeat imaging today, pain control. Imaging outcome will help guide orthopedic follow-up. Orthopedic had seen at [facility] but had recommended outpatient management. Given the degree of patient's pain today, may need acute consultation. ED Course:   Initial assessment performed. The patients presenting problems have been discussed, and they are in agreement with the care plan formulated and outlined with them. I have encouraged them to ask questions as they arise throughout their visit. ED Course as of Sep 28 0914   Mon Sep 20, 2021   1152 Spoke to palliative care physician, Dr. Rah Goins through [facility] supportive care center that patient had seen this past Friday for pain control. She relayed that in August, patient's MRI of the lower spine had shown severe central and foraminal narrowing of the L5-S1 area as well as sacral insufficiency fractures bilaterally right greater than left. She was in touch with the patient over the weekend due to patient's worsening pain and increased grogginess with the OxyContin moving to 20 mg.    agrees with repeating imaging to make sure there is no new progression of fractures, pain control with potential outpatient adjustment of OxyContin to 15 mg of breakthrough oxycodone to 5 mg. [TL]   1316 Pain improved after initial medication. MRI pending. [TL]   56 Pt still reporting significant pain despite multiple doses of IV pain medications, MRI reviewed, evidence of acute bilateral sacral insufficiency fracture. Mild to moderate spinal canal stenosis at L5-S1. Given intractable pain and need for Ortho spine evaluation, will admit to Hospitalist.       [HW]   1710 Consult Note:  Maryse Solano MD spoke with Dr. Mi Meeks  Specialty: Hospitalist  Discussed pt's hx, disposition, and available diagnostic and imaging results. Reviewed care plans. Agree with management and plan thus far. Consultant will evaluate pt. [HW]      ED Course User Index  [HW] Noel Asif MD  [TL] Jeremie Maravilla MD         Disposition:  Admit    Admit Note:    Pt is being admitted by Dr. Mi Meeks. The results of their tests and reason(s) for their admission have been discussed with pt and/or available family. They convey agreement and understanding for the need to be admitted and for admission diagnosis. Diagnosis     Clinical Impression: Intractable back pain    Attestations:    Jody Higuera MD    Please note that this dictation was completed with [employer], the computer voice recognition software. Quite often unanticipated grammatical, syntax, homophones, and other interpretive errors are inadvertently transcribed by the computer software. Please disregard these errors. Please excuse any errors that have escaped final proofreading. Thank you.

## 2021-09-20 NOTE — H&P
Hospitalist Admission Note    NAME: Kenneth Somers   :  1955   MRN:  [id number]     Date/Time:  2021 4:36 PM    Patient PCP: Peter Fajardo MD  ______________________________________________________________________  Given the patient's current clinical presentation, I have a high level of concern for decompensation if discharged from the emergency department. Complex decision making was performed, which includes reviewing the patient's available past medical records, laboratory results, and x-ray films. My assessment of this patient's clinical condition and my plan of care is as follows. Assessment / Plan:  Intractable lower back pain  Hx of severe spinal stenosis as well as sacral insufficiency fractures  Hx of rectal cancer  Admit to Medical floor  Pain control - continue home Oxycontin, added IV Morphine for breakthrough pain as PRN  Refer to Dr. Molina Luke ER note where she spoke with pt's Palliative Care Physician through [address] [place] [address] to understand that pt's MRI findings from today are not new. PT/OT noelle  Pt just completed her 8th session of chemotherapy, which is all that was planned, on Friday. She is to have surgery at some point but the date is still unknown. Can add Gabapentin  No labs done in the ER - will send a basic workup    Code Status: Full  Surrogate Decision Maker: daughter   DVT Prophylaxis: Lovenox  GI Prophylaxis: not indicated  Baseline: Independent      Subjective:   CHIEF COMPLAINT: back pain    HISTORY OF PRESENT ILLNESS:     Lisha Adjutant is a 78 y.o.  female who presents with CC listed above. Pt states that she has been having chronic back pain for quite some time. She has a history of compression fractures and spinal stenosis. She has been undergoing chemotherapy for rectal cancer at [facility].  This morning however she states that her lower back pain was so significant that she could barely move and that she is unable to ambulate due to the pain. She denies any history of incontinence. She endorses some tingling in her lower extremities and a radiation of the pain down her buttocks. We were asked to admit for work up and evaluation of the above problems. Past Medical History:   Diagnosis Date    Atrial fibrillation ([facility] [address]     recently diagnosed in 2021.  Ill-defined condition     rectal bleeding.  Thromboembolus ([facility] [address]     PE in 1999. Past Surgical History:   Procedure Laterality Date    COLONOSCOPY N/A 2/23/2021    COLONOSCOPY performed by Lopez Mendieta MD at [facility] UNLISTED      breast biopsy. Social History     Tobacco Use    Smoking status: Never Smoker    Smokeless tobacco: Never Used   Substance Use Topics    Alcohol use: Never        Family History   Problem Relation Age of Onset    Cancer Paternal Uncle     Cancer Mother     Cancer Father      No Known Allergies     Prior to Admission medications    Medication Sig Start Date End Date Taking? Authorizing Provider   oxyCODONE ER (OxyCONTIN) 20 mg ER tablet Take 20 mg by mouth every twelve (12) hours. Yes Isabelle, MD Shelton   oxyCODONE (OXYIR) 5 mg capsule Take 5 mg by mouth every four (4) hours as needed for Pain. Yes Isabelle, MD [place]   acetaminophen (Tylenol Extra Strength) 500 mg tablet Take 1,000 mg by mouth every six (6) hours as needed for Pain. Yes Shelton Walton MD   omeprazole (PRILOSEC) 20 mg capsule Take 20 mg by mouth daily. Yes [place] Walton MD   potassium chloride (K-DUR, KLOR-CON) 20 mEq tablet Take 20 mEq by mouth daily. Yes Shelton Walton MD   midodrine HCl (MIDODRINE PO) Take  by mouth two (2) times a day. Yes Shelton Walton MD   hydrOXYzine HCL (ATARAX) 25 mg tablet Take 25 mg by mouth three (3) times daily as needed. Yes Provider, Historical   ascorbic acid, vitamin C, (Vitamin C) 500 mg tablet Take 1,000 mg by mouth daily.    Yes Provider, Historical cholecalciferol (Vitamin D3) (1000 Units /25 mcg) tablet Take 1,000 Units by mouth daily. Yes Provider, Historical   omega 3-DHA-EPA-fish oil 1,000 mg (120 mg-180 mg) capsule Take 1 Cap by mouth daily. Patient not taking: Reported on 9/20/2021    Provider, Historical   OTHER Take 1 Cap by mouth. Coconut supplement. Patient not taking: Reported on 9/20/2021    Provider, Historical   apixaban (Eliquis) 5 mg tablet Take 5 mg by mouth two (2) times a day. Patient not taking: Reported on 9/20/2021    Provider, Historical       REVIEW OF SYSTEMS:     I am not able to complete the review of systems because:    The patient is intubated and sedated    The patient has altered mental status due to his acute medical problems    The patient has baseline aphasia from prior stroke(s)    The patient has baseline dementia and is not reliable historian    The patient is in acute medical distress and unable to provide information           Total of 12 systems reviewed as follows:       POSITIVE= underlined text  Negative = text not underlined  General:  fever, chills, sweats, generalized weakness, weight loss/gain,      loss of appetite   Eyes:    blurred vision, eye pain, loss of vision, double vision  ENT:    rhinorrhea, pharyngitis   Respiratory:   cough, sputum production, SOB, EDMONDS, wheezing, pleuritic pain   Cardiology:   chest pain, palpitations, orthopnea, PND, edema, syncope   Gastrointestinal:  abdominal pain , N/V, diarrhea, dysphagia, constipation, bleeding   Genitourinary:  frequency, urgency, dysuria, hematuria, incontinence   Muskuloskeletal :  arthralgia, myalgia, back pain  Hematology:  easy bruising, nose or gum bleeding, lymphadenopathy   Dermatological: rash, ulceration, pruritis, color change / jaundice  Endocrine:   hot flashes or polydipsia   Neurological:  headache, dizziness, confusion, focal weakness, paresthesia,     Speech difficulties, memory loss, gait difficulty  Psychological: Feelings of anxiety, depression, agitation    Objective:   VITALS:    Visit Vitals  BP (!) 150/75   Pulse 78   Temp 98 °F (36.7 °C)   Resp 17   Ht 5' 5\" (1.651 m)   Wt 65.8 kg (145 lb)   SpO2 96%   BMI 24.13 kg/m²       PHYSICAL EXAM:    General:    Alert, cooperative, no distress, appears stated age. HEENT: Atraumatic, anicteric sclerae, pink conjunctivae     No oral ulcers, mucosa moist, throat clear, dentition fair  Neck:  Supple, symmetrical,  thyroid: non tender  Lungs:   Clear to auscultation bilaterally. No Wheezing or Rhonchi. No rales. Chest wall:  No tenderness  No Accessory muscle use. Heart:   Regular  rhythm,  No  murmur   No edema  Abdomen:   Soft, non-tender. Not distended. Bowel sounds normal  Extremities: No cyanosis. No clubbing,      Skin turgor normal, Capillary refill normal, Radial dial pulse 2+  Skin:     Not pale. Not Jaundiced  No rashes   Psych:  Good insight. Not depressed. Not anxious or agitated. Neurologic: EOMs intact. No facial asymmetry. No aphasia or slurred speech. LE sensation intact. LE motor exam limited by pain.  Alert and oriented X 4.     _______________________________________________________________________  Care Plan discussed with:    Comments   Patient x    Family  x    RN x    Care Manager                    Consultant:      _______________________________________________________________________  Expected  Disposition:   Home with Family    HH/PT/OT/RN x   SNF/LTC    PERRY    ________________________________________________________________________  TOTAL TIME:  54 Minutes    Critical Care Provided     Minutes non procedure based      Comments     Reviewed previous records   >50% of visit spent in counseling and coordination of care  Discussion with patient and/or family and questions answered       ________________________________________________________________________  Signed: Chris Wylie MD    Procedures: see electronic medical records for all procedures/Xrays and details which were not copied into this note but were reviewed prior to creation of Plan. LAB DATA REVIEWED:    No results found for this or any previous visit (from the past 24 hour(s)).

## 2021-09-20 NOTE — PROGRESS NOTES

## 2021-09-21 LAB
INR PPP: 1 (ref 0.9–1.1)
PROTHROMBIN TIME: 10.8 SEC (ref 9–11.1)

## 2021-09-21 PROCEDURE — 74011250636 HC RX REV CODE- 250/636: Performed by: GENERAL ACUTE CARE HOSPITAL

## 2021-09-21 PROCEDURE — 97530 THERAPEUTIC ACTIVITIES: CPT

## 2021-09-21 PROCEDURE — 97161 PT EVAL LOW COMPLEX 20 MIN: CPT

## 2021-09-21 PROCEDURE — 74011250637 HC RX REV CODE- 250/637: Performed by: NURSE PRACTITIONER

## 2021-09-21 PROCEDURE — 74011250637 HC RX REV CODE- 250/637: Performed by: INTERNAL MEDICINE

## 2021-09-21 PROCEDURE — 74011250637 HC RX REV CODE- 250/637: Performed by: GENERAL ACUTE CARE HOSPITAL

## 2021-09-21 PROCEDURE — 85610 PROTHROMBIN TIME: CPT

## 2021-09-21 PROCEDURE — 65270000015 HC RM PRIVATE ONCOLOGY

## 2021-09-21 PROCEDURE — 97116 GAIT TRAINING THERAPY: CPT

## 2021-09-21 PROCEDURE — 97165 OT EVAL LOW COMPLEX 30 MIN: CPT

## 2021-09-21 PROCEDURE — 36415 COLL VENOUS BLD VENIPUNCTURE: CPT

## 2021-09-21 PROCEDURE — 97535 SELF CARE MNGMENT TRAINING: CPT

## 2021-09-21 RX ORDER — OXYCODONE HCL 20 MG/1
20 TABLET, FILM COATED, EXTENDED RELEASE ORAL EVERY 12 HOURS
Status: DISCONTINUED | OUTPATIENT
Start: 2021-09-21 | End: 2021-09-23 | Stop reason: HOSPADM

## 2021-09-21 RX ADMIN — GABAPENTIN 200 MG: 100 CAPSULE ORAL at 09:08

## 2021-09-21 RX ADMIN — GABAPENTIN 200 MG: 100 CAPSULE ORAL at 17:34

## 2021-09-21 RX ADMIN — OXYCODONE HYDROCHLORIDE 20 MG: 10 TABLET, FILM COATED, EXTENDED RELEASE ORAL at 00:39

## 2021-09-21 RX ADMIN — Medication 10 ML: at 00:40

## 2021-09-21 RX ADMIN — ENOXAPARIN SODIUM 40 MG: 40 INJECTION SUBCUTANEOUS at 09:08

## 2021-09-21 RX ADMIN — OXYCODONE HYDROCHLORIDE 20 MG: 20 TABLET, FILM COATED, EXTENDED RELEASE ORAL at 12:35

## 2021-09-21 RX ADMIN — MIDODRINE HYDROCHLORIDE 5 MG: 5 TABLET ORAL at 17:34

## 2021-09-21 RX ADMIN — OXYCODONE HYDROCHLORIDE 20 MG: 20 TABLET, FILM COATED, EXTENDED RELEASE ORAL at 21:36

## 2021-09-21 RX ADMIN — GABAPENTIN 200 MG: 100 CAPSULE ORAL at 21:36

## 2021-09-21 RX ADMIN — GABAPENTIN 200 MG: 100 CAPSULE ORAL at 00:39

## 2021-09-21 RX ADMIN — MIDODRINE HYDROCHLORIDE 5 MG: 5 TABLET ORAL at 09:08

## 2021-09-21 RX ADMIN — OXYCODONE HYDROCHLORIDE AND ACETAMINOPHEN 1000 MG: 500 TABLET ORAL at 09:08

## 2021-09-21 RX ADMIN — MIDODRINE HYDROCHLORIDE 5 MG: 5 TABLET ORAL at 00:39

## 2021-09-21 NOTE — PROGRESS NOTES
End of Shift Note    Bedside shift change report given to Beryl(oncoming nurse) by Gloria Phoenix, RN (offgoing nurse). Report included the following information SBAR    Shift worked:  [phone]     Shift summary and any significant changes: Followed POC as ordered. Patient ID'd with two identifiers. Prioritized safety at all times. Bed alarm plugged in and activated. Pt. oriented to call bell. Practiced infection prevention and hand hygiene. Managed pain as ordered. Clustered care while rounding hourly. Assessed pt., monitored vital signs, and administered medications. Encouraged patient to turn and/or shift weight. Assisted with ADL's, mobility, toileting, and hygiene. CHG infection prevention treatment. Provided pt. education and employed therapeutic communication. Collaborated as part of the health care team. Advocated for pt. Monitored I/O's and tracked calorie consumption. Monitored lab values. Preserved IV access. Fall and skin precautions. Changed linens. Pt. worked with PT/OT. 2RN skin check. Admitted pt to unit, transferred from ortho. Vital signs stable. Pt. denied cardiac discomfort, nausea, and SOB. Pt c/o pain 7:10 in right leg, managed with ordered medication, palliative consult pending. A&OX4. Pt reports full sensation. No sensory deficits. Positive pulses. Active bowel sounds. Lungs clear bilaterally. Voids with incontinence brief and wick due to pain issue. Concerns for physician to address:       Zone phone for oncoming shift:   [phone]       Activity:  Activity Level:  Up with Assistance  Number times ambulated in hallways past shift: 0  Number of times OOB to chair past shift: 0    Cardiac:   Cardiac Monitoring: No           Access:   Current line(s): PIV     Genitourinary:   Urinary status: voiding and external catheter    Respiratory:   O2 Device: None (Room air)  Chronic home O2 use?: NO  Incentive spirometer at bedside: YES     GI:  Last Bowel Movement Date: 09/19/21  Current diet:  ADULT DIET Easy to Chew  Passing flatus: YES  Tolerating current diet: YES       Pain Management:   Patient states pain is manageable on current regimen: NO    Skin:  Zane Score: 19  Interventions: PT/OT consult and internal/external urinary devices    Patient Safety:  Fall Score:  Total Score: 4  Interventions: bed/chair alarm, assistive device (walker, cane, etc), gripper socks, pt to call before getting OOB and stay with me (per policy)  High Fall Risk: Yes    Length of Stay:  Expected LOS: - - -  Actual LOS: 1      [place] Geni RN

## 2021-09-21 NOTE — PROGRESS NOTES
Spiritual Care Assessment/Progress Note  [facility]      NAME: Ros Vergara      MRN: [id number]  AGE: 78 y.o. SEX: female  Temple Affiliation: Non Gnosticism   Language: English     9/21/2021     Total Time (in minutes): 6     Spiritual Assessment begun in [facility] through conversation with:         []Patient        [] Family    [] Friend(s)        Reason for Consult: Palliative Care, Initial/Spiritual Assessment     Spiritual beliefs: (Please include comment if needed)     [] Identifies with a joel tradition:         [] Supported by a joel community:            [] Claims no spiritual orientation:           [] Seeking spiritual identity:                [] Adheres to an individual form of spirituality:           [x] Not able to assess:                           Identified resources for coping:      [] Prayer                               [] Music                  [] Guided Imagery     [] Family/friends                 [] Pet visits     [] Devotional reading                         [x] Unknown     [] Other:                                             Interventions offered during this visit: (See comments for more details)    Patient Interventions: Initial/Spiritual assessment, patient floor           Plan of Care:     [] Support spiritual and/or cultural needs    [] Support AMD and/or advance care planning process      [] Support grieving process   [] Coordinate Rites and/or Rituals    [] Coordination with community clergy   [x] No spiritual needs identified at this time   [] Detailed Plan of Care below (See Comments)  [] Make referral to Music Therapy  [] Make referral to Pet Therapy     [] Make referral to Addiction services  [] Make referral to [facility]  [] Make referral to Spiritual Care Partner  [] No future visits requested        [x] Follow up upon further referrals     Comments: Attempted to offer supportive visit for pt in [facility].   Reviewed pt's chart prior to this visit to augment any observed or expressed support needs this pt may have. Pt expressed no particular support needs and declined visit at this time. Affirmed ongoing availability of support. Shannan Avila MDiv.  Staff   Request  Support/Spiritual Care Services via [facility]

## 2021-09-21 NOTE — PROGRESS NOTES
Attempted to see pt for OT services. Pt was in the process of transferring to a different unit. Will defer but continue to follow.

## 2021-09-21 NOTE — PROGRESS NOTES
Problem: Mobility Impaired (Adult and Pediatric)  Goal: *Acute Goals and Plan of Care (Insert Text)  Description: FUNCTIONAL STATUS PRIOR TO ADMISSION: Patient was independent and active without use of DME.    HOME SUPPORT PRIOR TO ADMISSION: The patient lived with daughter. Her daughter works during the day but is home 24/7 with patient. Physical Therapy Goals  Initiated 9/21/2021  1. Patient will move from supine to sit and sit to supine  in bed with modified independence within 7 day(s). 2.  Patient will transfer from bed to chair and chair to bed with modified independence using the least restrictive device within 7 day(s). 3.  Patient will perform sit to stand with modified independence within 7 day(s). 4.  Patient will ambulate with modified independence for 120 feet with the least restrictive device within 7 day(s). 5.  Patient will ascend/descend 14 stairs with 1 handrail(s) with minimal assistance/contact guard assist within 7 day(s). 9/21/2021 1401 by Tye Tripp PT  Outcome: Progressing Towards Goal   PHYSICAL THERAPY EVALUATION  Patient: Peter Carver (78 y.o. female)  Date: 9/21/2021  Primary Diagnosis: Intractable back pain [M54.9]        Precautions:   Fall, Bed Alarm    ASSESSMENT  Based on the objective data described below, the patient presents with decreased independence with functional mobility, decreased strength, and increased risk for falls S/P admission for intractable back pain. Her PMHx is remarkable for rectal CA with radiation and chemotherapy. MRI indicates acute bilateral sacral insufficiency fracture, mild to moderate spinal canal stenosis at L5-S1. Patient also reports complications with her BP since starting chemo therapy stating she has been diagnosed with POTS. On evaluation patient reports pain on R>L with decreased ability to weight bear. She is able to perform side stepping and stand pivot transfer with bilateral HHA demonstrating fair balance.  Seated on BSC and EOB patient shifts weight to L to off weight the right. She performs hygiene independently and is provided assistance for changing brief. She is positioned for pressure relief and education is provided on log roll technique which patient is performing intuitively. Patient would benefit from RW trial.     Current Level of Function Impacting Discharge (mobility/balance): Min A with bilateral HHA    Functional Outcome Measure: The patient scored 45/100 on the Barthel outcome measure. Other factors to consider for discharge: medical stability, decreased balance, inability to ambulate and transfer increased distances, stair training      Patient will benefit from skilled therapy intervention to address the above noted impairments. PLAN :  Recommendations and Planned Interventions: bed mobility training, transfer training, gait training, therapeutic exercises, neuromuscular re-education, edema management/control, patient and family training/education, and therapeutic activities      Frequency/Duration: Patient will be followed by physical therapy:  5 times a week to address goals. Recommendation for discharge: (in order for the patient to meet his/her long term goals)  Therapy up to 5 days/week in SNF setting v. [facility] pending progress with acute care  -patient will need to be able to ambulate and take stairs to return home     This discharge recommendation:  Has not yet been discussed the attending provider and/or case management    IF patient discharges home will need the following DME: to be determined (TBD)         SUBJECTIVE:   Patient stated I can't seem to get comfortable.     OBJECTIVE DATA SUMMARY:   HISTORY:    Past Medical History:   Diagnosis Date    Atrial fibrillation ([facility]     recently diagnosed in 2021. Ill-defined condition     rectal bleeding. Thromboembolus ([facility]     PE in 1999.       Past Surgical History:   Procedure Laterality Date    COLONOSCOPY N/A 2/23/2021 COLONOSCOPY performed by Faye Lyles MD at [facility] UNLISTED      breast biopsy. Personal factors and/or comorbidities impacting plan of care: please see above    Home Situation  Home Environment: Private residence  # Steps to Enter: 3  Rails to Enter: Yes  Hand Rails : Right  One/Two Story Residence: Two story  # of Interior Steps: 14  Interior Rails: Right  Lift Chair Available: No  Living Alone: No  Support Systems: Child(amalia)  Current DME Used/Available at Home: Cane, straight, Grab bars, Shower chair    EXAMINATION/PRESENTATION/DECISION MAKING:   Critical Behavior:              Hearing: Auditory  Auditory Impairment: None  Skin:    Edema:   Range Of Motion:                          Strength: Tone & Sensation:                                  Coordination:     Vision:   Corrective Lenses: Glasses  Functional Mobility:  Bed Mobility:     Supine to Sit: Stand-by assistance  Sit to Supine: Stand-by assistance     Transfers:  Sit to Stand: Contact guard assistance  Stand to Sit: Contact guard assistance  Stand Pivot Transfers: Minimum assistance                    Balance:   Sitting: Intact; Without support  Standing: Impaired; With support  Standing - Static: Constant support; Fair  Standing - Dynamic : Constant support; Fair  Ambulation/Gait Training:                                                         Stairs: Therapeutic Exercises:       Functional Measure:  Barthel Index:    Bathin  Bladder: 5  Bowels: 5  Groomin  Dressin  Feedin  Mobility: 10  Stairs: 0  Toilet Use: 5  Transfer (Bed to Chair and Back): 10  Total: 45/100       The Barthel ADL Index: Guidelines  1. The index should be used as a record of what a patient does, not as a record of what a patient could do. 2. The main aim is to establish degree of independence from any help, physical or verbal, however minor and for whatever reason.   3. The need for supervision renders the patient not independent. 4. A patient's performance should be established using the best available evidence. Asking the patient, friends/relatives and nurses are the usual sources, but direct observation and common sense are also important. However direct testing is not needed. 5. Usually the patient's performance over the preceding 24-48 hours is important, but occasionally longer periods will be relevant. 6. Middle categories imply that the patient supplies over 50 per cent of the effort. 7. Use of aids to be independent is allowed. Trang Baker, Barthel, D.W. (9211). Functional evaluation: the Barthel Index. [facility] (14)2. Serjio Keagan nikhil TOÑA Gamez, Kathaleen Boas., Janelle Bright., [place], [address] (1999). Measuring the change indisability after inpatient rehabilitation; comparison of the responsiveness of the Barthel Index and Functional [place] Measure. Journal of Neurology, Neurosurgery, and Psychiatry, 66(4), 816-673. Meño Gonzalez, N.J.A, MARIE Mike, & Yany Boateng MABDULLAHI. (2004.) Assessment of post-stroke quality of life in cost-effectiveness studies: The usefulness of the Barthel Index and the EuroQoL-5D.  Quality of Life Research, 15, 073-09            Physical Therapy Evaluation Charge Determination   History Examination Presentation Decision-Making   MEDIUM  Complexity : 1-2 comorbidities / personal factors will impact the outcome/ POC  LOW Complexity : 1-2 Standardized tests and measures addressing body structure, function, activity limitation and / or participation in recreation  MEDIUM Complexity : Evolving with changing characteristics  Other outcome measures Barthel  HIGH       Based on the above components, the patient evaluation is determined to be of the following complexity level: MEDIUM    Pain Rating:      Activity Tolerance:   Fair    After treatment patient left in no apparent distress:   Supine in bed, Call bell within reach, Bed / chair alarm activated, and Side rails x 3    COMMUNICATION/EDUCATION:   The patients plan of care was discussed with: Occupational therapist and Registered nurse. Fall prevention education was provided and the patient/caregiver indicated understanding., Patient/family have participated as able in goal setting and plan of care. , and Patient/family agree to work toward stated goals and plan of care.     Thank you for this referral.  Isaura Toledo, PT   Time Calculation: 36 mins

## 2021-09-21 NOTE — PROGRESS NOTES
Transition of Care Plan:    RUR: 11%  Disposition: home with daughter, resumption of [facility]   Follow up appointments: PCP, specialists   DME needed: N/A  Transportation at Discharge: daughter  [address] or means to access home: yes      IM Medicare Letter: to be reviewed prior to d/c   Is patient a BCPI-A Bundle: No          If yes, was Bundle Letter given?:     Caregiver Contact: Kelley Sanchez (daughter)   Discharge Caregiver contacted prior to discharge? To be contacted prior to d/c    Reason for Admission:  Intractable back pain                     RUR Score:  11%                   Plan for utilizing home health: yes, resumption of HHC  ([facility])       PCP: First and Last name:  Юлия Castellon MD     Name of Practice:    Are you a current patient: Yes/No: yes   Approximate date of last visit: February 2021   Can you participate in a virtual visit with your PCP: yes                    Current Advanced Directive/Advance Care Plan: Full Code    [address] (ACP) Conversation    Date of Conversation: 9/21/21    Conducted with: Patient with Decision Making Capacity    Healthcare Decision Maker:   No healthcare decision makers have been documented. Click here to complete [address] including selection of the Healthcare Decision Maker Relationship (ie \"Primary\")    Today we documented Decision Maker(s) consistent with Legal Next of Kin hierarchy. Content/Action Overview: Has ACP document(s) NOT on file - requested patient to provide  Reviewed DNR/DNI and patient elects Full Code (Attempt Resuscitation)    Length of Voluntary ACP Conversation in minutes:  <16 minutes (Non-Billable)    Saskia Tamez        Transition of Care Plan:  Home with daughter, resumption of HHC ([facility])    Chart reviewed. CM met with pt at bedside to introduce self and role. Pt admitted with intractable back pain from home.  Prior to admission, pt resides with daughter in a two level home with 3 steps to enter. Pt reports her bedroom is upstairs. Pt only has access to a half bathroom on the first floor. Pt is typically independent with ADLs and ambulatory with a cane. Pt has access to a RW, shower chair, and grab bars at home if needed. Pt identifies support from her daughter who works from home and her three sisters who live out of town. Pt manages her own medications and finances. Daughter will transport home at time of discharge. Preferred pharmacy is [email or web address]. PCP is Lona Rojas with last known visit in February 2021. Pt is followed by [facility] supportive care clinic for pain management and had last appointment on 9/17/21. Pt is unvaccinated against covid-19. Pt reports she recently filled out an ACP at [facility] supportive care office and has a copy at home. She states daughter is mPOA. Discussed PT/OT recommendation of SNF vs home w/ resumption of HHC. Pt voiced that her goal is to figure out the right combination of pain medicine and return home with daughter and [facility]. She is not interested in SNF placement at this time. No prior hx of SNF stays reported. CM will continue to follow for discharge planning needs. Care Management Interventions  PCP Verified by CM: Yes  Last Visit to PCP: 02/19/21  Palliative Care Criteria Met (RRAT>21 & CHF Dx)?: No  Mode of Transport at Discharge:  Other (see comment) (daughter)  Transition of Care Consult (CM Consult): Discharge Planning  Discharge Durable Medical Equipment: No  Physical Therapy Consult: Yes  Occupational Therapy Consult: Yes  Speech Therapy Consult: No  Support Systems: Child(amalia), Other Family Member(s)  Confirm Follow Up Transport: Family  The Plan for Transition of Care is Related to the Following Treatment Goals : resumption of HHC   The Patient and/or Patient Representative was Provided with a Choice of Provider and Agrees with the Discharge Plan?: Yes  Freedom of Choice List was Provided with Basic Dialogue that Supports the Patient's Individualized Plan of Care/Goals, Treatment Preferences and Shares the Quality Data Associated with the Providers?: No   Resource Information Provided?: No  Discharge Location  Discharge Placement: Home with home health    Lalita Melendrez, [address] [place] [address] Merlin, [facility]  [phone]

## 2021-09-21 NOTE — PROGRESS NOTES
Received message from patient's nurse stating:  Patient is requesting to take her dose of midodrine she usually takes in the evenings at home. Discussion / orders:    Entered order for midodrine 5 mg p.o. twice daily per PTA home medication list         Please note that this note was dictated using Dragon computer voice recognition software. Quite often unanticipated grammatical, syntax, homophones, and other interpretive errors are inadvertently transcribed by the computer software. Please disregard these errors. Please excuse any errors that have escaped final proofreading.

## 2021-09-21 NOTE — PROGRESS NOTES
Hospitalist Progress Note    NAME: Sherol Nageotte   :  1955   MRN:  [id number]       Assessment / Plan:    Intractable lower back pain  Hx of severe spinal stenosis as well as sacral insufficiency fractures  Hx of rectal cancer   patient continues to report ambulatory dysfunction and severe pain with standing of  Pain control - continue home Oxycontin, continue as needed IV Morphine for breakthrough pain as PRN  Refer to Dr. Jerardo Domingo ER note where she spoke with pt's Palliative Care Physician through [facility] to understand that pt's MRI findings from today are not new. PT/OT noelle  Pt just completed her 8th session of chemotherapy, which is all that was planned, on Friday. She is to have surgery at some point but the date is still unknown. Continue gabapentin added this admission  And palliative care consult for pain control    Leukopenia:  -Likely chemotherapy-induced     Code Status: Full  Surrogate Decision Maker: daughter   DVT Prophylaxis: Lovenox  GI Prophylaxis: not indicated  Baseline: Independent         Subjective:     Chief Complaint / Reason for Physician Visit  \" Continues to report pain with standing up and ambulatory dysfunction. Reports pain is excruciating on standing up. Pain is controlled while resting\". Discussed with RN events overnight. Review of Systems:  Symptom Y/N Comments  Symptom Y/N Comments   Fever/Chills n   Chest Pain n    Poor Appetite n   Edema n    Cough n   Abdominal Pain n    Sputum n   Joint Pain y    SOB/EDMONDS n   Pruritis/Rash     Nausea/vomit n   Tolerating PT/OT     Diarrhea n   Tolerating Diet     Constipation n   Other       Could NOT obtain due to:      Objective:     VITALS:   Last 24hrs VS reviewed since prior progress note.  Most recent are:  Patient Vitals for the past 24 hrs:   Temp Pulse Resp BP SpO2   21 1032 98 °F (36.7 °C) 77 18 (!) 152/92 96 %   21 0719 97.7 °F (36.5 °C) 75 16 138/82 97 %   21 0302 97.4 °F (36.3 °C) 76 16 137/80 98 %   09/20/21 2052 97.9 °F (36.6 °C) 85 16 133/83 97 %   09/20/21 1845 97.7 °F (36.5 °C) 87 15 (!) 141/79 97 %   09/20/21 1750 97.8 °F (36.6 °C) 81 14 (!) 139/90 95 %   09/20/21 1700    131/81    09/20/21 1630    (!) 152/86    09/20/21 1600    (!) 150/80    09/20/21 1530    (!) 150/75    09/20/21 1400     97 %       Intake/Output Summary (Last 24 hours) at 9/21/2021 1158  Last data filed at 9/21/2021 0226  Gross per 24 hour   Intake    Output 1200 ml   Net -1200 ml        PHYSICAL EXAM:  General: WD, WN. Alert, cooperative, no acute distress    EENT:  EOMI. Anicteric sclerae. MMM  Resp:  CTA bilaterally, no wheezing or rales. No accessory muscle use  CV:  Regular  rhythm,  No edema  GI:  Soft, Non distended, Non tender.  +Bowel sounds  Neurologic:  Alert and oriented X 3, normal speech,   Psych:   Good insight. Not anxious nor agitated  Skin:  No rashes. No jaundice    Reviewed most current lab test results and cultures  YES  Reviewed most current radiology test results   YES  Review and summation of old records today    NO  Reviewed patient's current orders and MAR    YES  PMH/SH reviewed - no change compared to H&P  ________________________________________________________________________  Care Plan discussed with:    Comments   Patient x    Family      RN x    Care Manager     Consultant                        Multidiciplinary team rounds were held today with , nursing, pharmacist and clinical coordinator. Patient's plan of care was discussed; medications were reviewed and discharge planning was addressed.      ________________________________________________________________________  Total NON critical care TIME:  28   Minutes    Total CRITICAL CARE TIME Spent:   Minutes non procedure based      Comments   >50% of visit spent in counseling and coordination of care     ________________________________________________________________________  [facility] Jerrica Bar MD     Procedures: see electronic medical records for all procedures/Xrays and details which were not copied into this note but were reviewed prior to creation of Plan. LABS:  I reviewed today's most current labs and imaging studies.   Pertinent labs include:  Recent Labs     09/20/21  1710   WBC 1.4*   HGB 11.7   HCT 35.2   PLT 98*     Recent Labs     09/21/21  0312 09/20/21  1710   NA  --  134*   K  --  4.4   CL  --  103   CO2  --  25   GLU  --  162*   BUN  --  22*   CREA  --  0.48*   CA  --  8.2*   INR 1.0 1.0       Signed: Maximus Garrison MD

## 2021-09-21 NOTE — PROGRESS NOTES
Bedside shift change report given to GRAHAM ZAPATA RN (oncoming nurse) by [address] [place], [place] [address]ambrosiomason Lancaster LPN (offgoing nurse). Report included the following information SBAR, Kardex, ED Summary, Intake/Output, MAR and Recent Results. wife and snoring.

## 2021-09-21 NOTE — PROGRESS NOTES
Problem: Self Care Deficits Care Plan (Adult)  Goal: *Acute Goals and Plan of Care (Insert Text)  Description:   FUNCTIONAL STATUS PRIOR TO ADMISSION: Patient was independent and active without use of DME. Pt reports she just finished 8th round of chemo at [facility]. HOME SUPPORT: The patient lived with daughter but did not require assist.    Occupational Therapy Goals  Initiated 9/21/2021  1. Patient will perform grooming in standing with independence within 7 day(s). 2.  Patient will perform lower body dressing with modified independence within 7 day(s). 3.  Patient will perform bathing with modified independence within 7 day(s). 4.  Patient will perform toilet transfers with independence within 7 day(s). 5.  Patient will perform all aspects of toileting with independence within 7 day(s). 6.  Patient will participate in upper extremity therapeutic exercise/activities with independence for 10 minutes within 7 day(s). 7.  Patient will utilize energy conservation techniques during functional activities with verbal cues within 7 day(s). 9/21/2021 1412 by Binh Stinson OT  Outcome: Progressing Towards Goal    OCCUPATIONAL THERAPY EVALUATION  Patient: Brandon Oro (78 y.o. female)  Date: 9/21/2021  Primary Diagnosis: Intractable back pain [M54.9]        Precautions: Hx of cancer  Fall, Bed Alarm    ASSESSMENT  Based on the objective data described below, the patient presents with increased back pain as well as generalized weakness and decreased endurance impacting ability to complete ADLs independently. Prior to onset on back pain, pt reports she was independent with all ADLs and most IADLs living with daughter. Per EMR, pt has been previously diagnosed with sacral insufficiency fractures, has orthopedic appointment in approximately 1 month and has been seen by supportive care palliative care physician at [facility] for pain control.  On this date pt was AOx4, required up to [facility] for functional transfers and SPV for ADLs. While seated pt unabel to maintain midline, often leaning to L side 2/2 shooting pain in LLE. Pt reports pain decreasing once in standing, tolerated toilet transfer @ BSC level and completed bedroom mobility using RW around bed (~10ft) with CGA and increased time. Pt declined therapist offer to complete bathroom mobilty 2/2 pain. Upon return to bed pt repositioned with multiple pillows for pain mgmt. Do not anticipate pt will have OT needs upon DC home pending functional progress/pain mgmt in acute care setting. Current Level of Function Impacting Discharge (ADLs/self-care): up to min A; CGA for transfers    Functional Outcome Measure: The patient scored Total: 45/100 on the Barthel Index outcome measure which is indicative of 65% impaired ability to care for basic self needs/dependency on others; inferred 65% dependency on others for instrumental ADLs. Other factors to consider for discharge: pt lives with daughter; hx of cancer     Patient will benefit from skilled therapy intervention to address the above noted impairments. PLAN :  Recommendations and Planned Interventions: self care training, functional mobility training, therapeutic exercise, balance training, therapeutic activities, endurance activities, patient education, and home safety training    Frequency/Duration: Patient will be followed by occupational therapy 4 times a week to address goals. Recommendation for discharge: (in order for the patient to meet his/her long term goals)  No skilled occupational therapy/ follow up rehabilitation needs identified at this time. This discharge recommendation:  A follow-up discussion with the attending provider and/or case management is planned    IF patient discharges home will need the following DME: may require RW pending p! Mgmt and acute care discourse       SUBJECTIVE:   Patient stated this all started about a month ago.     OBJECTIVE DATA SUMMARY: HISTORY:   Past Medical History:   Diagnosis Date    Atrial fibrillation ([facility] [address]     recently diagnosed in 2021. Ill-defined condition     rectal bleeding. Thromboembolus ([facility] [address]     PE in 1999. Past Surgical History:   Procedure Laterality Date    COLONOSCOPY N/A 2/23/2021    COLONOSCOPY performed by Jeffrey Benedict MD at [facility] 328 UNLISTED      breast biopsy. Expanded or extensive additional review of patient history:     Home Situation  Home Environment: Private residence  # Steps to Enter: 3  Rails to Enter: Yes  Hand Rails : Right  One/Two Story Residence: Two story  # of Interior Steps: 15  Interior Rails: Right  Lift Chair Available: No  Living Alone: No  Support Systems: Child(amalia)  Current DME Used/Available at Home: Cane, straight, Grab bars, Shower chair    Hand dominance: Right    EXAMINATION OF PERFORMANCE DEFICITS:  Cognitive/Behavioral Status:  Neurologic State: Alert  Orientation Level: Oriented X4  Cognition: Follows commands  Perception: Appears intact  Perseveration: Perseverates during conversation  Safety/Judgement: Insight into deficits    Skin: intact    Edema: none noted    Hearing: Auditory  Auditory Impairment: None    Vision/Perceptual:                                Corrective Lenses: Glasses    Range of Motion:    AROM: Generally decreased, functional                         Strength:    Strength: Generally decreased, functional                Coordination:  Coordination: Generally decreased, functional  Fine Motor Skills-Upper: Left Intact; Right Intact    Gross Motor Skills-Upper: Left Intact; Right Intact    Tone & Sensation:    Tone: Normal  Sensation: Intact                      Balance:  Sitting: Intact; Without support  Standing: Impaired; With support  Standing - Static: Constant support; Fair  Standing - Dynamic : Constant support; Fair    Functional Mobility and Transfers for ADLs:  Bed Mobility:  Rolling: Independent  Supine to Sit: Stand-by assistance  Sit to Supine: Stand-by assistance  Scooting: Supervision    Transfers:  Sit to Stand: Contact guard assistance  Stand to Sit: Contact guard assistance  Toilet Transfer : Contact guard assistance (transferred <> [facility] .)    ADL Assessment:  Feeding: Independent    Oral Facial Hygiene/Grooming: Independent    Bathing: Minimum assistance    Upper Body Dressing: Supervision    Lower Body Dressing: Supervision    Toileting: Supervision                ADL Intervention and task modifications:                           Lower Body Dressing Assistance  Socks: Supervision  Position Performed: Seated edge of bed    Toileting  Toileting Assistance: Supervision  Bladder Hygiene: Supervision  Clothing Management: Contact guard assistance  Cues: Verbal cues provided  Adaptive Equipment: Other (comment) ([facility])    Cognitive Retraining  Safety/Judgement: Insight into deficits       Functional Measure:  Barthel Index:    Bathin  Bladder: 5  Bowels: 5  Groomin  Dressin  Feedin  Mobility: 10  Stairs: 0  Toilet Use: 5  Transfer (Bed to Chair and Back): 10  Total: 45/100        The Barthel ADL Index: Guidelines  1. The index should be used as a record of what a patient does, not as a record of what a patient could do. 2. The main aim is to establish degree of independence from any help, physical or verbal, however minor and for whatever reason. 3. The need for supervision renders the patient not independent. 4. A patient's performance should be established using the best available evidence. Asking the patient, friends/relatives and nurses are the usual sources, but direct observation and common sense are also important. However direct testing is not needed. 5. Usually the patient's performance over the preceding 24-48 hours is important, but occasionally longer periods will be relevant.   6. Middle categories imply that the patient supplies over 50 per cent of the effort. 7. Use of aids to be independent is allowed. Priscilla Abarca., Barthel, DChaseWChase (2752). Functional evaluation: the Barthel Index. [address] (14)2. TOÑA Pearce, Yannick Saldnaa., Agnieszka Gilmore., [place], [address] (). Measuring the change indisability after inpatient rehabilitation; comparison of the responsiveness of the Barthel Index and Functional [place] Measure. Journal of Neurology, Neurosurgery, and Psychiatry, 66(4), 886-620. GABE Oh, MARIE Mike, & Wing Zaida M.A. (2004.) Assessment of post-stroke quality of life in cost-effectiveness studies: The usefulness of the Barthel Index and the EuroQoL-5D. Quality of Life Research, 15, 419-97         Occupational Therapy Evaluation Charge Determination   History Examination Decision-Making   LOW Complexity : Brief history review  LOW Complexity : 1-3 performance deficits relating to physical, cognitive , or psychosocial skils that result in activity limitations and / or participation restrictions  LOW Complexity : No comorbidities that affect functional and no verbal or physical assistance needed to complete eval tasks       Based on the above components, the patient evaluation is determined to be of the following complexity level: LOW   Pain Ratin/10 in sitting, 4/10 in standing     Activity Tolerance:   Fair    After treatment patient left in no apparent distress:    Supine in bed, Heels elevated for pressure relief, Call bell within reach, Bed / chair alarm activated, and Side rails x 3    COMMUNICATION/EDUCATION:   The patients plan of care was discussed with: Physical therapist, Occupational therapist, and Registered nurse. Patient/family have participated as able in goal setting and plan of care. This patients plan of care is appropriate for delegation to [facility].     Thank you for this referral.  Cinthya Patricia OT  Time Calculation: 24 mins

## 2021-09-22 LAB
ANION GAP SERPL CALC-SCNC: 6 MMOL/L (ref 5–15)
BUN SERPL-MCNC: 23 MG/DL (ref 6–20)
BUN/CREAT SERPL: 42 (ref 12–20)
CALCIUM SERPL-MCNC: 8.4 MG/DL (ref 8.5–10.1)
CHLORIDE SERPL-SCNC: 102 MMOL/L (ref 97–108)
CO2 SERPL-SCNC: 28 MMOL/L (ref 21–32)
COVID-19 RAPID TEST, COVR: NOT DETECTED
CREAT SERPL-MCNC: 0.55 MG/DL (ref 0.55–1.02)
ERYTHROCYTE [DISTWIDTH] IN BLOOD BY AUTOMATED COUNT: 15.8 % (ref 11.5–14.5)
GLUCOSE SERPL-MCNC: 89 MG/DL (ref 65–100)
HCT VFR BLD AUTO: 31.2 % (ref 35–47)
HGB BLD-MCNC: 10.5 G/DL (ref 11.5–16)
INR PPP: 1 (ref 0.9–1.1)
MCH RBC QN AUTO: 28.2 PG (ref 26–34)
MCHC RBC AUTO-ENTMCNC: 33.7 G/DL (ref 30–36.5)
MCV RBC AUTO: 83.9 FL (ref 80–99)
NRBC # BLD: 0 K/UL (ref 0–0.01)
NRBC BLD-RTO: 0 PER 100 WBC
PLATELET # BLD AUTO: 86 K/UL (ref 150–400)
PMV BLD AUTO: 11.3 FL (ref 8.9–12.9)
POTASSIUM SERPL-SCNC: 3.8 MMOL/L (ref 3.5–5.1)
PROTHROMBIN TIME: 10.9 SEC (ref 9–11.1)
RBC # BLD AUTO: 3.72 M/UL (ref 3.8–5.2)
SARS-COV-2, COV2: NORMAL
SODIUM SERPL-SCNC: 136 MMOL/L (ref 136–145)
SOURCE, COVRS: NORMAL
WBC # BLD AUTO: 2.9 K/UL (ref 3.6–11)

## 2021-09-22 PROCEDURE — 85610 PROTHROMBIN TIME: CPT

## 2021-09-22 PROCEDURE — 97530 THERAPEUTIC ACTIVITIES: CPT

## 2021-09-22 PROCEDURE — 65270000015 HC RM PRIVATE ONCOLOGY

## 2021-09-22 PROCEDURE — 74011250636 HC RX REV CODE- 250/636: Performed by: GENERAL ACUTE CARE HOSPITAL

## 2021-09-22 PROCEDURE — 74011250637 HC RX REV CODE- 250/637: Performed by: NURSE PRACTITIONER

## 2021-09-22 PROCEDURE — 85027 COMPLETE CBC AUTOMATED: CPT

## 2021-09-22 PROCEDURE — 74011250637 HC RX REV CODE- 250/637: Performed by: INTERNAL MEDICINE

## 2021-09-22 PROCEDURE — 74011250637 HC RX REV CODE- 250/637: Performed by: GENERAL ACUTE CARE HOSPITAL

## 2021-09-22 PROCEDURE — 36415 COLL VENOUS BLD VENIPUNCTURE: CPT

## 2021-09-22 PROCEDURE — 97116 GAIT TRAINING THERAPY: CPT

## 2021-09-22 PROCEDURE — 97535 SELF CARE MNGMENT TRAINING: CPT

## 2021-09-22 PROCEDURE — 87635 SARS-COV-2 COVID-19 AMP PRB: CPT

## 2021-09-22 PROCEDURE — 80048 BASIC METABOLIC PNL TOTAL CA: CPT

## 2021-09-22 RX ADMIN — MORPHINE SULFATE 2 MG: 2 INJECTION, SOLUTION INTRAMUSCULAR; INTRAVENOUS at 00:33

## 2021-09-22 RX ADMIN — Medication 10 ML: at 00:32

## 2021-09-22 RX ADMIN — ENOXAPARIN SODIUM 40 MG: 40 INJECTION SUBCUTANEOUS at 09:36

## 2021-09-22 RX ADMIN — GABAPENTIN 200 MG: 100 CAPSULE ORAL at 09:36

## 2021-09-22 RX ADMIN — GABAPENTIN 200 MG: 100 CAPSULE ORAL at 22:33

## 2021-09-22 RX ADMIN — OXYCODONE HYDROCHLORIDE AND ACETAMINOPHEN 1000 MG: 500 TABLET ORAL at 09:36

## 2021-09-22 RX ADMIN — OXYCODONE HYDROCHLORIDE 20 MG: 20 TABLET, FILM COATED, EXTENDED RELEASE ORAL at 09:36

## 2021-09-22 RX ADMIN — OXYCODONE HYDROCHLORIDE 20 MG: 20 TABLET, FILM COATED, EXTENDED RELEASE ORAL at 22:33

## 2021-09-22 RX ADMIN — MIDODRINE HYDROCHLORIDE 5 MG: 5 TABLET ORAL at 16:09

## 2021-09-22 RX ADMIN — GABAPENTIN 200 MG: 100 CAPSULE ORAL at 16:28

## 2021-09-22 RX ADMIN — Medication 10 ML: at 22:33

## 2021-09-22 RX ADMIN — Medication 10 ML: at 05:44

## 2021-09-22 RX ADMIN — MIDODRINE HYDROCHLORIDE 5 MG: 5 TABLET ORAL at 08:00

## 2021-09-22 NOTE — PROGRESS NOTES
End of Shift Note    Bedside shift change report given to [facility] (oncoming nurse) by Laurne Lenz (offgoing nurse). Report included the following information SBAR and Kardex    Shift worked:  7p-11pm     Shift summary and any significant changes:    Patient rested for the majority of shift. Pt stated pain was well controlled and meds given per MAR. Hourly rounding completed. Purewick in place. Concerns for physician to address:       Zone phone for oncoming shift:          Activity:  Activity Level: Up with Assistance  Number times ambulated in hallways past shift: 0  Number of times OOB to chair past shift: 0    Cardiac:   Cardiac Monitoring: No           Access:   Current line(s): PIV     Genitourinary:   Urinary status: external catheter    Respiratory:   O2 Device: None (Room air)  Chronic home O2 use?: NO  Incentive spirometer at bedside: N/A     GI:  Last Bowel Movement Date: 09/19/21  Current diet:  ADULT DIET Easy to Chew  Passing flatus: YES  Tolerating current diet: YES       Pain Management:   Patient states pain is manageable on current regimen: YES    Skin:  Zane Score: 20  Interventions: increase time out of bed and internal/external urinary devices    Patient Safety:  Fall Score:  Total Score: 3  Interventions: bed/chair alarm, assistive device (walker, cane, etc) and gripper socks  High Fall Risk: Yes    Length of Stay:  Expected LOS: - - -  Actual LOS: 2      Lauren Lenz

## 2021-09-22 NOTE — PROGRESS NOTES
End of Shift Note    Bedside shift change report given to *** (oncoming nurse) by Suleman Frazier RN (offgoing nurse). Report included the following information SBAR    Shift worked:  [id number]     Shift summary and any significant changes: Followed POC as ordered. Patient ID'd with two identifiers. Prioritized safety at all times. Bed alarm plugged in and activated. Pt. oriented to call bell. Practiced infection prevention and hand hygiene. Managed pain as ordered. Clustered care while rounding hourly. Assessed pt., monitored vital signs, and administered medications. Encouraged patient to turn and/or shift weight. Assisted with ADL's, mobility, toileting, and hygiene. Encouraged CHG infection prevention treatment. Provided pt. education and employed therapeutic communication. Collaborated as part of the health care team. Advocated for pt. Monitored I/O's and tracked calorie consumption. Monitored lab values. Preserved IV access. Fall precautions. Viital signs stable. Pt. denied cardiac discomfort, nausea, and SOB. Pt c/o decreasing rt hip pain. . A&OX4. Pt reports full sensation. No sensory deficits. Positive pulses. Active bowel sounds. Lungs clear to diminished bilaterally. Voids without difficulty. Melchor bloody stools. NPO at 00:00. H&H ordered. Concerns for physician to address:  Danny Patient blood from rectum. Zone phone for oncoming shift:   [phone]       Activity:  Activity Level:  Up with Assistance  Number times ambulated in hallways past shift: 0  Number of times OOB to chair past shift: 0    Cardiac:   Cardiac Monitoring: No           Access:   Current line(s): PIV     Genitourinary:   Urinary status: voiding    Respiratory:   O2 Device: None (Room air)  Chronic home O2 use?: NO  Incentive spirometer at bedside: YES     GI:  Last Bowel Movement Date: 09/22/21  Current diet:  ADULT DIET Easy to Chew  DIET NPO Ice Chips, Sips of Water with Meds  Passing flatus: YES  Tolerating current diet: YES       Pain Management:   Patient states pain is manageable on current regimen: YES    Skin:  Zane Score: 19  Interventions: increase time out of bed, PT/OT consult and internal/external urinary devices    Patient Safety:  Fall Score:  Total Score: 4  Interventions: bed/chair alarm, assistive device (walker, cane, etc), gripper socks, pt to call before getting OOB and stay with me (per policy)  High Fall Risk: Yes    Length of Stay:  Expected LOS: 2d 21h  Actual LOS: 2      Destin Nails RN

## 2021-09-22 NOTE — PROGRESS NOTES
Transition of Care Plan:     RUR: 12%  Disposition: IPR placement- referrals pending   Follow up appointments: PCP, specialists   DME needed: N/A  Transportation at Discharge: daughter vs medical transport   Rakel Monsivais or means to access home: yes      IM Medicare Letter: to be reviewed prior to d/c   Is patient a BCPI-A Bundle: No                     If yes, was Bundle Letter given?:     Caregiver Contact: Susana Reddy (daughter)   Discharge Caregiver contacted prior to discharge?  yes    Update 4:24 PM  Received call from Paty Kohler at [address]. Pt has been declined for IPR placement. Per medical review, pt is too high level functioning for IPR and is more appropriate for SNF placement vs HH. CM attempted to call pt via room phone with no answer to relay this message. Will continue to follow. Chart reviewed. CM continuing to follow for discharge planning. Noted recommendations for IPR placement. Met with pt at bedside to obtain choice. Pt prefers [facility]. Referral sent via [employer] and is pending review. CM placed call to daughter to provide update. Once accepted, pt will require insurance authorization for placement. Will continue to follow.     TRUONG Jason   Care Manager, [facility]  [phone]

## 2021-09-22 NOTE — PROGRESS NOTES
Problem: Mobility Impaired (Adult and Pediatric)  Goal: *Acute Goals and Plan of Care (Insert Text)  Description: FUNCTIONAL STATUS PRIOR TO ADMISSION: Patient was independent and active without use of DME.    HOME SUPPORT PRIOR TO ADMISSION: The patient lived with daughter. Her daughter works during the day but is home 24/7 with patient. Physical Therapy Goals  Initiated 9/21/2021  1. Patient will move from supine to sit and sit to supine  in bed with modified independence within 7 day(s). 2.  Patient will transfer from bed to chair and chair to bed with modified independence using the least restrictive device within 7 day(s). 3.  Patient will perform sit to stand with modified independence within 7 day(s). 4.  Patient will ambulate with modified independence for 120 feet with the least restrictive device within 7 day(s). 5.  Patient will ascend/descend 14 stairs with 1 handrail(s) with minimal assistance/contact guard assist within 7 day(s). Outcome: Progressing Towards Goal     PHYSICAL THERAPY TREATMENT  Patient: Mare Lopez (78 y.o. female)  Date: 9/22/2021  Diagnosis: Intractable back pain [M54.9] <principal problem not specified>       Precautions: Fall, Bed Alarm  Chart, physical therapy assessment, plan of care and goals were reviewed. ASSESSMENT  Patient continues with skilled PT services and is progressing towards goals. She is able to amb. In room today multiple laps with RW use ; however, one lateral LOB to the left needing min. Assist for recovery. Pt only has 1/2 bathroom on main floor and no bedroom, so if she is discharged home, she would need hospital bed, BSC, and recommended assist with all mobility, IADL's, and S/SBA for ADL's. .. it sounds as if her daughter would be unable to provide this based on pt's report of her daughter's work schedule. Recommend pt go to rehab.  To gain increased activity tolerance, safety, and overall strength to return to prior level and reduce fall risk at this time. Continue to follow. Current Level of Function Impacting Discharge (mobility/balance): bed mob. SBA ; transfers CGA ; gait CGA with one min. Assist for LOB episode    Other factors to consider for discharge: pt has 2nd level bedroom she would not be able to get to safely ; daughter works from home but on phone all day and unable to assist except for 15 min. Breaks x 2 and one 30 min. Lunch break per pt report         PLAN :  Patient continues to benefit from skilled intervention to address the above impairments. Continue treatment per established plan of care. to address goals. Recommendation for discharge: (in order for the patient to meet his/her long term goals)  Post-Acute Rehab. (inpatient rehab. Recommended)    This discharge recommendation:  Has been made in collaboration with the attending provider and/or case management    IF patient discharges home will need the following DME: to be determined (TBD)       SUBJECTIVE:   Patient stated I am thinking this is not as simple as I thought.     OBJECTIVE DATA SUMMARY:   Critical Behavior:  Neurologic State: Alert  Orientation Level: Oriented X4  Cognition: Poor safety awareness  Safety/Judgement: Decreased awareness of environment, Decreased awareness of need for assistance, Decreased insight into deficits, Decreased awareness of need for safety  Functional Mobility Training:  Bed Mobility:     Supine to Sit: Stand-by assistance  Sit to Supine: Stand-by assistance           Transfers:  Sit to Stand: Contact guard assistance  Stand to Sit: Contact guard assistance          Balance:  Sitting: Intact  Standing: Impaired  Standing - Static: Constant support; Fair (RW)  Standing - Dynamic : Constant support; Fair (RW)    Ambulation/Gait Training:  Distance (ft): 40 Feet (ft)  Assistive Device: Gait belt;Walker, rolling  Ambulation - Level of Assistance: Contact guard assistance (one LOB with min.  assist needed to correct ) Gait Abnormalities: Antalgic;Decreased step clearance        Base of Support: Widened     Speed/Radha: Pace decreased (<100 feet/min); Slow  Step Length: Left shortened;Right shortened        Interventions: Safety awareness training; Tactile cues; Verbal cues       Pain Rating:  C/o R hip pain 4/10 with gait efforts     Activity Tolerance:   Fair and requires frequent rest breaks    After treatment patient left in no apparent distress:   Call bell within reach, Bed / chair alarm activated, Side rails x 3, and L-sidelying with pillow between knees in bed    COMMUNICATION/COLLABORATION:   The patients plan of care was discussed with: Occupational therapist, Registered nurse, Case management, and NP .     Isa Serna PT, DPT   Time Calculation: 24 mins

## 2021-09-22 NOTE — PROGRESS NOTES
Consult received for overwhelming symtoms, acute on chronic back pain, hx of compression fx and spinal stenosis, rectal cancer s/p radiation , and chermotherapy at [facility], anxiety,  follows up with Palliative care clinic at [facility]. Case discussed with Jada Thurman, pain is currently better, she wants to go home today, plan to discharge her today, to follow up with Palliative care clinic at [facility]. We agreed to cancel the consult. Thank you .

## 2021-09-22 NOTE — PROGRESS NOTES
Hospitalist Progress Note    NAME: Vincente Kawasaki   :  1955   MRN:  [id number]     I reviewed pertinent labs and imaging, and discussed /agreed on the plan of care with Dr. Adan Albright. Assessment / Plan:  Intractable lower back pain  Hx of severe spinal stenosis as well as sacral insufficiency fractures with chronic lower back pain   Hx of rectal cancer Recent completion of radiation and 8th session of Chemo with planned surgical intervention at later day   -Patient continues to report ambulatory dysfunction and severe pain with standing of  -Recently seen by [facility] palliative care last Friday  -Continue PTA oxycodone, PRN morphine and gabapentin   -Refer to Dr. Jayme Stoner ER note where she spoke with pt's Palliative Care Physician through [facility] to understand that pt's MRI findings from today are not new. -Palliative care - s/o as patient is managed by [facility] palliative care   -Appreciate PT/OT evaluations - recommend [facility]   -Appreciate CM input - sent referrals to [facility], will need insurance auth  -Patient is medically stable for discharge and awaiting acceptance and insurance auth for [facility]     Leukopenia - improving   -Likely chemotherapy-induced      18.5 - 24.9 Normal weight / Body mass index is 22.48 kg/m². Estimated discharge date:   Barriers: placement/insurance auth     Code status: Full  Prophylaxis: Lovenox  Recommended Disposition: [facility]/Rehab     Subjective:     Chief Complaint / Reason for Physician Visit  Patient seen at bedside, reports pain has improved this AM. Discussed plan of care, patient verbalized understanding/agreement. Discussed with RN events overnight.      Review of Systems:  Symptom Y/N Comments  Symptom Y/N Comments   Fever/Chills n   Chest Pain n    Poor Appetite n   Edema n    Cough n   Abdominal Pain n    Sputum n   Joint Pain n    SOB/EDMONDS n   Pruritis/Rash n    Nausea/vomit n   Tolerating PT/OT y    Diarrhea n   Tolerating Diet y Constipation n   Other y Back pain      Could NOT obtain due to:      Objective:     VITALS:   Last 24hrs VS reviewed since prior progress note. Most recent are:  Patient Vitals for the past 24 hrs:   Temp Pulse Resp BP SpO2   09/22/21 0819 97.7 °F (36.5 °C) (!) 113 20 (!) 109/90 96 %   09/21/21 2339 98.4 °F (36.9 °C) (!) 103 18 121/81 98 %   09/21/21 2005 98.2 °F (36.8 °C) 80 18 130/83 97 %   09/21/21 1449 98.2 °F (36.8 °C) 77 18 (!) 156/79 97 %     No intake or output data in the 24 hours ending 09/22/21 1407     I had a face to face encounter and independently examined this patient on 9/22/2021, as outlined below:  PHYSICAL EXAM:  General: WD, WN. Alert, cooperative, no acute distress    EENT:  EOMI. Anicteric sclerae. MMM  Resp:  CTA bilaterally, no wheezing or rales. No accessory muscle use  CV:  Regular  rhythm,  No edema  GI:  Soft, Non distended, Non tender. +Bowel sounds  Neurologic:  Alert and oriented X 3, normal speech,   Psych:   Good insight. Not anxious nor agitated  Skin:  No rashes. No jaundice    Reviewed most current lab test results and cultures  YES  Reviewed most current radiology test results   YES  Review and summation of old records today    NO  Reviewed patient's current orders and MAR    YES  PMH/SH reviewed - no change compared to H&P  ________________________________________________________________________  Care Plan discussed with:    Comments   Patient x    Family      RN x    Care Manager x    Consultant  x Palliative care                      Multidiciplinary team rounds were held today with , nursing, pharmacist and clinical coordinator. Patient's plan of care was discussed; medications were reviewed and discharge planning was addressed.      ________________________________________________________________________  Total NON critical care TIME:  25   Minutes    Total CRITICAL CARE TIME Spent:   Minutes non procedure based      Comments   >50% of visit spent in counseling and coordination of care x    ________________________________________________________________________  Rosemarie Conn NP     Procedures: see electronic medical records for all procedures/Xrays and details which were not copied into this note but were reviewed prior to creation of Plan. LABS:  I reviewed today's most current labs and imaging studies.   Pertinent labs include:  Recent Labs     09/22/21  0544 09/20/21  1710   WBC 2.9* 1.4*   HGB 10.5* 11.7   HCT 31.2* 35.2   PLT 86* 98*     Recent Labs     09/22/21  0544 09/21/21  0312 09/20/21  1710     --  134*   K 3.8  --  4.4     --  103   CO2 28  --  25   GLU 89  --  162*   BUN 23*  --  22*   CREA 0.55  --  0.48*   CA 8.4*  --  8.2*   INR 1.0 1.0 1.0       Signed: Rosemarie Conn NP

## 2021-09-22 NOTE — PROGRESS NOTES
Problem: Self Care Deficits Care Plan (Adult)  Goal: *Acute Goals and Plan of Care (Insert Text)  Description:   FUNCTIONAL STATUS PRIOR TO ADMISSION: Patient was independent and active without use of DME. Pt reports she just finished 8th round of chemo at [facility]. HOME SUPPORT: The patient lived with daughter but did not require assist.    Occupational Therapy Goals  Initiated 9/21/2021  1. Patient will perform grooming in standing with independence within 7 day(s). 2.  Patient will perform lower body dressing with modified independence within 7 day(s). 3.  Patient will perform bathing with modified independence within 7 day(s). 4.  Patient will perform toilet transfers with independence within 7 day(s). 5.  Patient will perform all aspects of toileting with independence within 7 day(s). 6.  Patient will participate in upper extremity therapeutic exercise/activities with independence for 10 minutes within 7 day(s). 7.  Patient will utilize energy conservation techniques during functional activities with verbal cues within 7 day(s). Outcome: Progressing Towards Goal   OCCUPATIONAL THERAPY TREATMENT  Patient: Mare Lopez (78 y.o. female)  Date: 9/22/2021  Diagnosis: Intractable back pain [M54.9] <principal problem not specified>       Precautions: Fall, Bed Alarm  Chart, occupational therapy assessment, plan of care, and goals were reviewed. ASSESSMENT  Patient continues with skilled OT services and is progressing towards goals. Patient continues to be limited by intermittent severe back/R pelvis and leg pain limiting sitting and standing tolerance, overall poor safety awareness, lack of insight into deficits, poor safety awareness with 1 LOB requiring min A for correction along with RW use.  Tolerated standing activity with CGA to min A due to this, with poor awareness for need for pacing with R LE pain and weakness building requiring VCs for pacing and seated rest. Patient remains below baseline of independent, recommend inpatient rehab to maximize independence with ADLs and functional mobility to allow patient to return home to 2nd floor bedroom and daughter assist, if not recommend SNF level rehab or home with 24/7 S, A with ADLs, and significant DME. Current Level of Function Impacting Discharge (ADLs): up to min A, impaired balance    Other factors to consider for discharge: debility since chemo with flucuating pelvic and L LE pain, daughter works from home with minimal breaks         PLAN :  Patient continues to benefit from skilled intervention to address the above impairments. Continue treatment per established plan of care to address goals. Recommend with staff: bathroom with RW-do no leave unassisted, if not BSC use    Recommend next OT session: standing ADLs    Recommendation for discharge: (in order for the patient to meet his/her long term goals)  Therapy 3 hours per day 5-7 days per week    This discharge recommendation:  Has been made in collaboration with the attending provider and/or case management    IF patient discharges home will need the following DME: AE: long handled bathing, AE: long handled dressing, bedside commode, hospital bed, and walker: rolling       SUBJECTIVE:   Patient stated I just don't know what I want to do .     OBJECTIVE DATA SUMMARY:   Cognitive/Behavioral Status:  Neurologic State: Alert  Orientation Level: Oriented X4  Cognition: Poor safety awareness        Safety/Judgement: Decreased awareness of environment;Decreased awareness of need for assistance;Decreased insight into deficits; Decreased awareness of need for safety    Functional Mobility and Transfers for ADLs:  Bed Mobility:  Supine to Sit: Contact guard assistance  Sit to Supine: Contact guard assistance    Transfers:  Sit to Stand: Contact guard assistance     Bed to Chair: Minimum assistance    Balance:  Sitting: Intact  Standing: Impaired  Standing - Static: Fair  Standing - Dynamic : Fair    ADL Intervention:             Able to tolerated EOB with good sitting balance however mild impulsivity noted with leaning due to severe pain fluctuating, overall CGA for sit to stand with RW, required VCs throughout session for safety with RW management, 1 LOB with turning and patient leaning to R side to offload L LE, required min A for correction and to prevent fall. Educated on fall prevention, RW management, home safety, DME needed for transition home, patient very hesitant and stating daughter is unable to A with ADl during the day due to work and limited R [address]. Patient instructed and indicated understanding the benefits of maintaining activity tolerance, functional mobility, and independence with self care tasks during acute stay  to ensure safe return home and to baseline. Encouraged patient to increase frequency and duration OOB, be out of bed for all meals, perform daily ADLs (as approved by RN/MD regarding bathing etc), and performing functional mobility to/from bathroom. Lower Body Dressing Assistance  Cues: Visual/perceptual training/retraining         Cognitive Retraining  Organizing/Sequencing: Breaking task down  Following Commands: Follows multi-step simple commands/directions  Safety/Judgement: Decreased awareness of environment;Decreased awareness of need for assistance;Decreased insight into deficits; Decreased awareness of need for safety    Therapeutic Exercises:       Pain:  4/10 pelvic, Lside L leg    Activity Tolerance:   Fair and requires frequent rest breaks    After treatment patient left in no apparent distress:   Supine in bed, Call bell within reach, Bed / chair alarm activated, and Side rails x 3    COMMUNICATION/COLLABORATION:   The patients plan of care was discussed with: Physical therapist and Registered nurse.      Cheyenne Yeh, JOHN  Time Calculation: 24 mins

## 2021-09-23 VITALS
RESPIRATION RATE: 16 BRPM | HEIGHT: 65 IN | SYSTOLIC BLOOD PRESSURE: 121 MMHG | OXYGEN SATURATION: 97 % | DIASTOLIC BLOOD PRESSURE: 65 MMHG | BODY MASS INDEX: 22.51 KG/M2 | WEIGHT: 135.1 LBS | TEMPERATURE: 98.4 F | HEART RATE: 90 BPM

## 2021-09-23 LAB
BASOPHILS # BLD: 0 K/UL (ref 0–0.1)
BASOPHILS NFR BLD: 1 % (ref 0–1)
DIFFERENTIAL METHOD BLD: ABNORMAL
EOSINOPHIL # BLD: 0.1 K/UL (ref 0–0.4)
EOSINOPHIL NFR BLD: 4 % (ref 0–7)
ERYTHROCYTE [DISTWIDTH] IN BLOOD BY AUTOMATED COUNT: 15.9 % (ref 11.5–14.5)
HCT VFR BLD AUTO: 31 % (ref 35–47)
HGB BLD-MCNC: 10.3 G/DL (ref 11.5–16)
IMM GRANULOCYTES # BLD AUTO: 0 K/UL (ref 0–0.04)
IMM GRANULOCYTES NFR BLD AUTO: 0 % (ref 0–0.5)
LYMPHOCYTES # BLD: 0.9 K/UL (ref 0.8–3.5)
LYMPHOCYTES NFR BLD: 32 % (ref 12–49)
MCH RBC QN AUTO: 28 PG (ref 26–34)
MCHC RBC AUTO-ENTMCNC: 33.2 G/DL (ref 30–36.5)
MCV RBC AUTO: 84.2 FL (ref 80–99)
MONOCYTES # BLD: 0.2 K/UL (ref 0–1)
MONOCYTES NFR BLD: 8 % (ref 5–13)
NEUTS SEG # BLD: 1.5 K/UL (ref 1.8–8)
NEUTS SEG NFR BLD: 55 % (ref 32–75)
NRBC # BLD: 0 K/UL (ref 0–0.01)
NRBC BLD-RTO: 0 PER 100 WBC
PLATELET # BLD AUTO: 72 K/UL (ref 150–400)
PMV BLD AUTO: 12.2 FL (ref 8.9–12.9)
RBC # BLD AUTO: 3.68 M/UL (ref 3.8–5.2)
RBC MORPH BLD: ABNORMAL
WBC # BLD AUTO: 2.7 K/UL (ref 3.6–11)

## 2021-09-23 PROCEDURE — 85025 COMPLETE CBC W/AUTO DIFF WBC: CPT

## 2021-09-23 PROCEDURE — 74011250637 HC RX REV CODE- 250/637: Performed by: INTERNAL MEDICINE

## 2021-09-23 PROCEDURE — 74011250637 HC RX REV CODE- 250/637: Performed by: NURSE PRACTITIONER

## 2021-09-23 PROCEDURE — 74011250637 HC RX REV CODE- 250/637: Performed by: GENERAL ACUTE CARE HOSPITAL

## 2021-09-23 PROCEDURE — 36415 COLL VENOUS BLD VENIPUNCTURE: CPT

## 2021-09-23 PROCEDURE — 74011250636 HC RX REV CODE- 250/636: Performed by: GENERAL ACUTE CARE HOSPITAL

## 2021-09-23 RX ORDER — POLYETHYLENE GLYCOL 3350 17 G/17G
17 POWDER, FOR SOLUTION ORAL
Qty: 30 PACKET | Refills: 0 | Status: SHIPPED | OUTPATIENT
Start: 2021-09-23

## 2021-09-23 RX ORDER — GABAPENTIN 100 MG/1
200 CAPSULE ORAL 3 TIMES DAILY
Qty: 30 CAPSULE | Refills: 0 | Status: SHIPPED | OUTPATIENT
Start: 2021-09-23 | End: 2021-09-28

## 2021-09-23 RX ADMIN — MIDODRINE HYDROCHLORIDE 5 MG: 5 TABLET ORAL at 17:39

## 2021-09-23 RX ADMIN — MORPHINE SULFATE 2 MG: 2 INJECTION, SOLUTION INTRAMUSCULAR; INTRAVENOUS at 01:42

## 2021-09-23 RX ADMIN — GABAPENTIN 200 MG: 100 CAPSULE ORAL at 09:33

## 2021-09-23 RX ADMIN — OXYCODONE HYDROCHLORIDE 20 MG: 20 TABLET, FILM COATED, EXTENDED RELEASE ORAL at 10:18

## 2021-09-23 RX ADMIN — GABAPENTIN 200 MG: 100 CAPSULE ORAL at 17:39

## 2021-09-23 RX ADMIN — OXYCODONE HYDROCHLORIDE AND ACETAMINOPHEN 1000 MG: 500 TABLET ORAL at 09:33

## 2021-09-23 RX ADMIN — ENOXAPARIN SODIUM 40 MG: 40 INJECTION SUBCUTANEOUS at 09:34

## 2021-09-23 RX ADMIN — MIDODRINE HYDROCHLORIDE 5 MG: 5 TABLET ORAL at 09:33

## 2021-09-23 NOTE — PROGRESS NOTES
Pharmacist Discharge Medication Reconciliation    Significant PMH:   Past Medical History:   Diagnosis Date    Atrial fibrillation ([facility] [address]     recently diagnosed in 2021. Ill-defined condition     rectal bleeding. Thromboembolus ([facility] [address]     PE in 1999. Encounter Diagnoses:   Encounter Diagnosis   Name Primary? Intractable back pain Yes   Hx of severe spinal stenosis as well as sacral insufficiency fractures with chronic lower back pain   Hx of rectal cancer  Leukopenia  Allergies: Patient has no known allergies. Discharge Medications:   Current Discharge Medication List        START taking these medications    Details   gabapentin (NEURONTIN) 100 mg capsule Take 2 Capsules by mouth three (3) times daily for 5 days. Max Daily Amount: 600 mg. Qty: 30 Capsule, Refills: 0  Start date: 9/23/2021, End date: 9/28/2021    Associated Diagnoses: Intractable back pain      polyethylene glycol (MIRALAX) 17 gram packet Take 1 Packet by mouth daily as needed for Constipation. Qty: 30 Packet, Refills: 0  Start date: 9/23/2021           CONTINUE these medications which have NOT CHANGED    Details   oxyCODONE ER (OxyCONTIN) 20 mg ER tablet Take 20 mg by mouth every twelve (12) hours. oxyCODONE (OXYIR) 5 mg capsule Take 5 mg by mouth every four (4) hours as needed for Pain. acetaminophen (Tylenol Extra Strength) 500 mg tablet Take 1,000 mg by mouth every six (6) hours as needed for Pain. omeprazole (PRILOSEC) 20 mg capsule Take 20 mg by mouth daily. midodrine (PROAMATINE) 5 mg tablet Take 5 mg by mouth two (2) times a day.      hydrOXYzine HCL (ATARAX) 25 mg tablet Take 25 mg by mouth three (3) times daily as needed. ascorbic acid, vitamin C, (Vitamin C) 500 mg tablet Take 1,000 mg by mouth daily. cholecalciferol (Vitamin D3) (1000 Units /25 mcg) tablet Take 1,000 Units by mouth daily.            STOP taking these medications       potassium chloride (K-DUR, KLOR-CON) 20 mEq tablet Comments:   Reason for Stopping:         apixaban (Eliquis) 5 mg tablet Comments:   Reason for Stopping:               The patient's chart, MAR and AVS were reviewed by Theresa Parkinson [facility].     Discharging Provider: Coty Perez NP    Thank you,     Theresa Parkinson, [facility]

## 2021-09-23 NOTE — DISCHARGE INSTRUCTIONS
Patient Education        Back Pain: Care Instructions  Your Care Instructions     Back pain has many possible causes. It is often related to problems with muscles and ligaments of the back. It may also be related to problems with the nerves, discs, or bones of the back. Moving, lifting, standing, sitting, or sleeping in an awkward way can strain the back. Sometimes you don't notice the injury until later. Arthritis is another common cause of back pain. Although it may hurt a lot, back pain usually improves on its own within several weeks. Most people recover in 12 weeks or less. Using good home treatment and being careful not to stress your back can help you feel better sooner. Follow-up care is a key part of your treatment and safety. Be sure to make and go to all appointments, and call your doctor if you are having problems. It's also a good idea to know your test results and keep a list of the medicines you take. How can you care for yourself at home? · Sit or lie in positions that are most comfortable and reduce your pain. Try one of these positions when you lie down:  ? Lie on your back with your knees bent and supported by large pillows. ? Lie on the floor with your legs on the seat of a sofa or chair. ? Lie on your side with your knees and hips bent and a pillow between your legs. ? Lie on your stomach if it does not make pain worse. · Do not sit up in bed, and avoid soft couches and twisted positions. Bed rest can help relieve pain at first, but it delays healing. Avoid bed rest after the first day of back pain. · Change positions every 30 minutes. If you must sit for long periods of time, take breaks from sitting. Get up and walk around, or lie in a comfortable position. · Try using a heating pad on a low or medium setting for 15 to 20 minutes every 2 or 3 hours. Try a warm shower in place of one session with the heating pad. · You can also try an ice pack for 10 to 15 minutes every 2 to 3 hours. Put a thin cloth between the ice pack and your skin. · Take pain medicines exactly as directed. ? If the doctor gave you a prescription medicine for pain, take it as prescribed. ? If you are not taking a prescription pain medicine, ask your doctor if you can take an over-the-counter medicine. · Take short walks several times a day. You can start with 5 to 10 minutes, 3 or 4 times a day, and work up to longer walks. Walk on level surfaces and avoid hills and stairs until your back is better. · Return to work and other activities as soon as you can. Continued rest without activity is usually not good for your back. · To prevent future back pain, do exercises to stretch and strengthen your back and stomach. Learn how to use good posture, safe lifting techniques, and proper body mechanics. When should you call for help? Call your doctor now or seek immediate medical care if:    · You have new or worsening numbness in your legs.     · You have new or worsening weakness in your legs. (This could make it hard to stand up.)     · You lose control of your bladder or bowels. Watch closely for changes in your health, and be sure to contact your doctor if:    · You have a fever, lose weight, or don't feel well.     · You do not get better as expected. Where can you learn more? Go to http://www.benavides.com/  Enter I594 in the search box to learn more about \"Back Pain: Care Instructions. \"  Current as of: July 1, 2021               Content Version: 13.0  © 2006-2021 [employer]. Care instructions adapted under license by [employer] (which disclaims liability or warranty for this information). If you have questions about a medical condition or this instruction, always ask your healthcare professional. [place] [address] any warranty or liability for your use of this information.        Patient Education        Learning About Cancer Pain  What is cancer pain?    Cancer pain may be caused by the cancer or by the treatments and tests used. The pain may make it hard for you to do your normal activities, such as sleeping or eating. Over time, cancer pain can cause appetite and sleep problems, isolation, and depression. But most cancer pain can be managed with medicines and other methods. This may not mean that you have no pain but that it stays at a level that you can bear. Treating your pain will make you feel better. You will be more active, eat and sleep better, and enjoy your family and friends. What are some key points about cancer pain? · You are the only person who can say how much pain you have. If you tell your doctor when you have pain or when pain changes, your doctor can help you. · Cancer pain can almost always be relieved if you work with your doctor to create a treatment plan that is right for you. · Pain is often easier to control right after it starts instead of waiting until it becomes bad. · Take your medicines exactly as prescribed. Call your doctor if you think you are having a problem with your medicine. · You may find that taking your medicine works most of the time, but your pain flares up during extra activity or for no clear reason. This is called breakthrough pain. Ask your doctor what you can do if this happens. Your doctor can give you a prescription for fast-acting medicines that you can take for breakthrough pain. · People who take opioid pain medicines for cancer pain rarely develop opioid use disorder. Moderate to severe opioid use disorder is sometimes called addiction. Your body may come to expect daily doses of medicine to control the pain. But your doctor can gradually lower the amount you are taking when and if the cause of your pain is gone. What treatments can help you manage cancer pain? Medical treatments to manage cancer pain include:  · Prescription and over-the-counter medicines. Many types of medicines are used. Your doctor may suggest different combinations of medicines. · Surgery, chemotherapy, radiation, and hormone therapy. These may be used to remove or destroy a tumor that is causing pain. · Nerve treatments. These are used to help with nerve pain. They include:  ? A nerve block that uses medicine injected into or near the nerve. ? A nerve surgery that cuts the nerve to stop the pain. ? Other treatments, such as injecting a chemical or using heat or cold, to destroy the nerve and stop the pain. Nonmedical treatments include:  · Physical therapy, gentle massage, acupuncture, and heat or cold to ease pain. · Stretching, yoga, and exercises to help you keep your strength, flexibility, and mobility. · Relaxation, biofeedback, or meditation to relieve stress and anxiety. · Short-term crisis therapy with a counselor. This may help you manage your cancer pain or the discomfort from cancer treatments. · Education and emotional support. Learning as much as you can about your pain may help. So can sharing your feelings with others. A support group can be a safe and comfortable place to talk about your illness. How can you manage cancer pain? Your doctor needs all the information you can give about what your pain feels like. It often helps to write things down in a pain diary. · Write down when your pain starts, what it feels like, and how long it lasts. Use words like dull, aching, sharp, shooting, throbbing, or burning. · Note changes in your pain. Is it constant, or does it come and go? Do you have more than one kind of pain? How long does it last?  · Rate your pain on a scale of 0 to 10, with 0 being no pain and 10 being the worst pain you can imagine. · Write exactly where you feel pain. You can use a drawing. Say whether the pain is just in that one place or several places at once. Or tell your doctor if it travels from one place to another. · Write down what makes your pain better or worse.  Note when you used a treatment, how well it worked, and any side effects. If you and your doctor are not able to control your pain, ask about seeing a pain specialist. A pain specialist is a health professional who focuses on treating resistant pain. Talk to your doctor if you are having problems with depression. Treating depression can make it easier to manage your cancer pain. Where can you learn more? Go to http://www.gray.com/  Enter K531 in the search box to learn more about \"Learning About Cancer Pain. \"  Current as of: 2020               Content Version: 13.0  © 7997-5788 [employer]. Care instructions adapted under license by [employer] (which disclaims liability or warranty for this information). If you have questions about a medical condition or this instruction, always ask your healthcare professional. [place] [address] any warranty or liability for your use of this information. HOSPITALIST DISCHARGE INSTRUCTIONS    NAME: Cory Bruner   :  1955   MRN:  [id number]     Date/Time:  2021 9:54 AM    ADMIT DATE: 2021   DISCHARGE DATE: 2021     Attending Physician: Jean-Pierre Wylie NP    DISCHARGE DIAGNOSIS:  Intractable lower back pain  Hx of severe spinal stenosis as well as sacral insufficiency fractures with chronic lower back pain   Hx of rectal cancer  Leukopenia    Medications: Per above medication reconciliation. Pain Management: per above medications    Recommended diet: Regular Diet    Recommended activity: PT/OT per Home Health    Wound care: None    Indwelling devices:  None    Supplemental Oxygen: None    Required Lab work: none    Glucose management:  None    Code status: Full    Outside physician follow up:     Follow-up Information     Follow up With Specialties Details Why Contact Info    Pretty Eller MD Family Medicine In 6 days  [address] [facility] [address]  [address]  In 1 week  [address]  [address] [facility]  [phone]    [address] [place] [address]  In 3 days Call the [address] [facility] to schedule close follow up  [address] [facility] to avoid frequent ED visits. [facility] can treat; pains, sprains, cuts, wounds, high fevers, upper respiratory infections and much more. There medical team is equipped with all the tools necessary to provide advanced medical care in the comfort of you home, workplace, or location of need. The medical team consists of doctors, nurse practitioners, and EMTs. [facility] is available 7 days per week 9 am to 9 pm.   Request care by calling [phone] or by going online at [phone] [employer] [address]    Information obtained by :  I understand that if any problems occur once I am at home I am to contact my physician. I understand and acknowledge receipt of the instructions indicated above.                                                                                                                                            Physician's or R.N.'s Signature                                                                  Date/Time                                                                                                                                              Patient or Repres

## 2021-09-23 NOTE — PROGRESS NOTES
Transition of Care Plan:     RUR: 12%  Disposition: home with resumption of HHC, family support   Follow up appointments: PCP, specialists   DME needed: RW, BSC - ordered on 9/23 to be delivered to pt's home   Transportation at . [facility] transport, ETA 1830  Keys or means to access home: yes      IM Medicare Letter: reviewed on 9/23/21  Is patient a BCPI-A Bundle: No                     If yes, was Bundle Letter given?:     Caregiver Contact: Kym Davis (daughter)   Discharge Caregiver contacted prior to discharge?  yes    Chart reviewed. CM aware of discharge order. Met with pt at bedside this AM to finalize and review d/c plan. Informed pt of denial from [address] [place] [address] and [facility] vs SNF placement at discharge as pt is too high level functioning for IPR. Pt has spoken with daughter, [facility], and has decided to return home with resumption of HHC. Pt requiring medical transport at discharge. CM has arranged for BLS transport via [facility], ETA is 1830 per request of pt/daughter. CM to complete PCS form to include copy of H&P and facesheet which will be placed into chart. CM has submitted resumption of care orders to [facility] for PT, OT, and SN. Informed [facility] of planned d/c today. CM discussed DME needs at length with pt/daughter. PAIGE has submitted order for RW and BSC via [employer] to be delivered to pt's home after discharge. [facility] has accepted order. Information added to AVS. Pt and daughter have stated they do not have room in the home for a hospital bed at this time. Advised pt to consider getting a day bed or twin bed to use on first floor of the home until she is able to perform stairs to get to her bedroom. Pt will only have access to a half bath on first floor and plans to sponge bathe until able to access the second floor. Medicare pt has received, reviewed, and signed 2nd  letter informing them of their right to appeal the discharge.   Signed copy has been placed on pt bedside chart. Pt was provided with copy of letter to keep. PCP appointment made for 10/8 @ 2:45PM. See AVS for details. No further CM needs identified at this time. Pt is ready for d/c from a CM standpoint. Assigned RN informed. Care Management Interventions  PCP Verified by CM:  Yes  Last Visit to PCP: 02/19/21  Palliative Care Criteria Met (RRAT>21 & CHF Dx)?: No  Mode of Transport at Discharge: BLS ([facility])  Hospital Transport Time of Discharge: [address] (CM Consult): Discharge Planning, [address]: No  Reason Outside [place]: Patient already serviced by other home care/hospice agency  Discharge Durable Medical Equipment: Yes (, [facility] )  Physical Therapy Consult: Yes  Occupational Therapy Consult: Yes  Speech Therapy Consult: No  Support Systems: Child(amalia)  Confirm Follow Up Transport: Family  The Plan for Transition of Care is Related to the Following Treatment Goals : Resumption of HHC   The Patient and/or Patient Representative was Provided with a Choice of Provider and Agrees with the Discharge Plan?: Yes  Name of the Patient Representative Who was Provided with a Choice of Provider and Agrees with the Discharge Plan: Melchor Spears (daughter)  Freedom of Choice List was Provided with Basic Dialogue that Supports the Patient's Individualized Plan of Care/Goals, Treatment Preferences and Shares the Quality Data Associated with the Providers?: No   Resource Information Provided?: No  Discharge Location  Discharge Placement: Home with home health    [facility], [address], [facility]  [phone]

## 2021-09-23 NOTE — DISCHARGE SUMMARY
Hospitalist Discharge Summary     Patient ID:  Sherol Nageotte  [id number]  26 y.o.  1955 9/20/2021    PCP on record: Macy Lucero MD    Admit date: 9/20/2021  Discharge date and time: 9/23/2021    DISCHARGE DIAGNOSIS:    Intractable lower back pain  Hx of severe spinal stenosis as well as sacral insufficiency fractures with chronic lower back pain   Hx of rectal cancer  Leukopenia    CONSULTATIONS:  IP CONSULT TO PALLIATIVE CARE - PROVIDER    Excerpted HPI from H&P of Ashu Singleton MD:    Romana Starch is a 78 y.o.  female who presents with CC listed above. Pt states that she has been having chronic back pain for quite some time. She has a history of compression fractures and spinal stenosis. She has been undergoing chemotherapy for rectal cancer at [facility]. This morning however she states that her lower back pain was so significant that she could barely move and that she is unable to ambulate due to the pain. She denies any history of incontinence. She endorses some tingling in her lower extremities and a radiation of the pain down her buttocks.      We were asked to admit for work up and evaluation of the above problems.      ______________________________________________________________________  DISCHARGE SUMMARY/HOSPITAL COURSE:  for full details see H&P, daily progress notes, labs, consult notes.      Intractable lower back pain  Hx of severe spinal stenosis as well as sacral insufficiency fractures with chronic lower back pain   Hx of rectal cancer Recent completion of radiation and 8th session of Chemo with planned surgical intervention in November   Acute on Chronic GIB small amount of bright red stool consistent with rectal cancer and recent radiation, hgb stable - likely irritated hemorrhoids    -Patient reports pain has improved, fearful it will return   -Recently seen by [facility] palliative care last Friday  -Continue PTA oxycodone, OxyContin - continue gabapentin started while IP as it has helped with the pain   -Refer to Dr. Randa Diego ER note where she spoke with pt's Palliative Care Physician through [facility] to understand that pt's MRI findings from this admission are not new. -Palliative care - s/o as patient is managed by [facility] palliative care - recommend close follow up   -Appreciate PT/OT evaluations - recommend [facility]   -Appreciate CM input - sent referrals to [facility] but [facility] stated that patient is too high functioning   -Discharge home with [place]   Leukopenia - improving   -Likely chemotherapy-induced    Patient seen at bedside. Spoke with daughter via phone - long conversation with questions answered. Patient's plan of care has been reviewed with them. Patient and/or family have verbally conveyed their understanding and agreement of the patient's signs, symptoms, diagnosis, treatment and prognosis and additionally agree to follow up as recommended or return to [facility] should their condition change prior to follow-up. Discharge instructions have also been provided to the patient with some educational information regarding their diagnosis as well a list of reasons why they would want to return to the office prior to their follow-up appointment should their condition change.     _______________________________________________________________________  Patient seen and examined by me on discharge day. Pertinent Findings:  Gen:    Not in distress  Chest: Clear lungs  CVS:   Regular rhythm. No edema  Abd:  Soft, not distended, not tender  Neuro:  Alert and oriented x3   _______________________________________________________________________  DISCHARGE MEDICATIONS:   Current Discharge Medication List      START taking these medications    Details   gabapentin (NEURONTIN) 100 mg capsule Take 2 Capsules by mouth three (3) times daily for 5 days. Max Daily Amount: 600 mg.   Qty: 30 Capsule, Refills: 0  Start date: 9/23/2021, End date: 9/28/2021    Associated Diagnoses: Intractable back pain      polyethylene glycol (MIRALAX) 17 gram packet Take 1 Packet by mouth daily as needed for Constipation. Qty: 30 Packet, Refills: 0  Start date: 9/23/2021         CONTINUE these medications which have NOT CHANGED    Details   oxyCODONE ER (OxyCONTIN) 20 mg ER tablet Take 20 mg by mouth every twelve (12) hours. oxyCODONE (OXYIR) 5 mg capsule Take 5 mg by mouth every four (4) hours as needed for Pain. acetaminophen (Tylenol Extra Strength) 500 mg tablet Take 1,000 mg by mouth every six (6) hours as needed for Pain. omeprazole (PRILOSEC) 20 mg capsule Take 20 mg by mouth daily. midodrine HCl (MIDODRINE PO) Take  by mouth two (2) times a day.      hydrOXYzine HCL (ATARAX) 25 mg tablet Take 25 mg by mouth three (3) times daily as needed. ascorbic acid, vitamin C, (Vitamin C) 500 mg tablet Take 1,000 mg by mouth daily. cholecalciferol (Vitamin D3) (1000 Units /25 mcg) tablet Take 1,000 Units by mouth daily. omega 3-DHA-EPA-fish oil 1,000 mg (120 mg-180 mg) capsule Take 1 Cap by mouth daily. OTHER Take 1 Cap by mouth. Coconut supplement. STOP taking these medications       potassium chloride (K-DUR, KLOR-CON) 20 mEq tablet Comments:   Reason for Stopping:         apixaban (Eliquis) 5 mg tablet Comments:   Reason for Stopping:                 Patient Follow Up Instructions:    Activity: PT/OT per Home Health  Diet: Regular Diet      Follow-up Information     Follow up With Specialties Details Why Contact Info    Karlene Rojas MD Family Medicine In 6 days  [address] [address]  [phone]      [address] [place] [address]  In 1 week  [facility] 53  [address] [facility] [phone]    [address]  In 3 days Call the [facility] [address] [place] [address] to schedule close follow up  [address] [facility]  [address]  [phone]        ________________________________________________________________    Risk of deterioration: Low    Condition at Discharge:  Stable  __________________________________________________________________    Disposition  Home with family and home health services    ____________________________________________________________________    Code Status: Full Code  ___________________________________________________________________      Total time in minutes spent coordinating this discharge (includes going over instructions, follow-up, prescriptions, and preparing report for sign off to her PCP) :  >30 minutes    Signed:  Shannan Fernandes NP

## 2021-09-23 NOTE — PROGRESS NOTES
End of Shift Note    Bedside shift change report given to Jonathan Horton RN (oncoming nurse) by Lindsey Cordova RN (offgoing nurse). Report included the following information SBAR, Kardex, Intake/Output, MAR and Recent Results    Shift worked:  7p-7a     Shift summary and any significant changes:     Pt stable, scheduled meds given, pain complaints in R side leg, back, hip, pt ambulated twice to bathroom for bloody watery red/brown BM but was unable to obtain occult stool due to it being flushed or covered in urine  Labs drawn     Concerns for physician to address:  none     Zone phone for oncoming shift:   [phone]       Activity:  Activity Level: Up with Assistance  Number times ambulated in hallways past shift: 0  Number of times OOB to chair past shift: 2    Cardiac:   Cardiac Monitoring: No           Access:   Current line(s): PIV     Genitourinary:   Urinary status: voiding    Respiratory:   O2 Device: None (Room air)  Chronic home O2 use?: NO  Incentive spirometer at bedside: NO     GI:  Last Bowel Movement Date: 09/23/21  Current diet:  ADULT DIET Regular  Passing flatus: YES  Tolerating current diet: YES       Pain Management:   Patient states pain is manageable on current regimen: YES    Skin:  Zane Score: 19  Interventions: increase time out of bed and internal/external urinary devices    Patient Safety:  Fall Score:  Total Score: 4  Interventions: assistive device (walker, cane, etc), gripper socks, pt to call before getting OOB and stay with me (per policy)  High Fall Risk: Yes    Length of Stay:  Expected LOS: 2d 21h  Actual LOS: 3      Lindsey Cordova RN

## 2022-03-19 PROBLEM — M54.9 INTRACTABLE BACK PAIN: Status: ACTIVE | Noted: 2021-09-20

## 2022-11-03 ENCOUNTER — HOSPITAL ENCOUNTER (EMERGENCY)
Age: 67
Discharge: HOME | End: 2022-11-03
Attending: STUDENT IN AN ORGANIZED HEALTH CARE EDUCATION/TRAINING PROGRAM
Payer: MEDICARE

## 2022-11-03 ENCOUNTER — APPOINTMENT (OUTPATIENT)
Dept: GENERAL RADIOLOGY | Age: 67
End: 2022-11-03
Attending: STUDENT IN AN ORGANIZED HEALTH CARE EDUCATION/TRAINING PROGRAM
Payer: MEDICARE

## 2022-11-03 ENCOUNTER — APPOINTMENT (OUTPATIENT)
Dept: CT IMAGING | Age: 67
End: 2022-11-03
Attending: STUDENT IN AN ORGANIZED HEALTH CARE EDUCATION/TRAINING PROGRAM
Payer: MEDICARE

## 2022-11-03 VITALS
DIASTOLIC BLOOD PRESSURE: 88 MMHG | HEART RATE: 77 BPM | OXYGEN SATURATION: 96 % | RESPIRATION RATE: 18 BRPM | TEMPERATURE: 97.7 F | SYSTOLIC BLOOD PRESSURE: 150 MMHG

## 2022-11-03 DIAGNOSIS — S12.101A CLOSED NONDISPLACED FRACTURE OF SECOND CERVICAL VERTEBRA, UNSPECIFIED FRACTURE MORPHOLOGY, INITIAL ENCOUNTER (HCC): Primary | ICD-10-CM

## 2022-11-03 PROCEDURE — 74011250636 HC RX REV CODE- 250/636: Performed by: STUDENT IN AN ORGANIZED HEALTH CARE EDUCATION/TRAINING PROGRAM

## 2022-11-03 PROCEDURE — 70450 CT HEAD/BRAIN W/O DYE: CPT

## 2022-11-03 PROCEDURE — 99284 EMERGENCY DEPT VISIT MOD MDM: CPT

## 2022-11-03 PROCEDURE — 72125 CT NECK SPINE W/O DYE: CPT

## 2022-11-03 PROCEDURE — 74011250637 HC RX REV CODE- 250/637: Performed by: STUDENT IN AN ORGANIZED HEALTH CARE EDUCATION/TRAINING PROGRAM

## 2022-11-03 PROCEDURE — 73090 X-RAY EXAM OF FOREARM: CPT

## 2022-11-03 RX ORDER — HYDROCODONE BITARTRATE AND ACETAMINOPHEN 5; 325 MG/1; MG/1
1 TABLET ORAL
Status: COMPLETED | OUTPATIENT
Start: 2022-11-03 | End: 2022-11-03

## 2022-11-03 RX ORDER — HYDROCODONE BITARTRATE AND ACETAMINOPHEN 5; 325 MG/1; MG/1
1 TABLET ORAL
Qty: 12 TABLET | Refills: 0 | Status: SHIPPED | OUTPATIENT
Start: 2022-11-03 | End: 2022-11-06

## 2022-11-03 RX ORDER — ONDANSETRON 4 MG/1
4 TABLET, ORALLY DISINTEGRATING ORAL
Status: COMPLETED | OUTPATIENT
Start: 2022-11-03 | End: 2022-11-03

## 2022-11-03 RX ORDER — ACETAMINOPHEN AND CODEINE PHOSPHATE 300; 30 MG/1; MG/1
1 TABLET ORAL
Status: COMPLETED | OUTPATIENT
Start: 2022-11-03 | End: 2022-11-03

## 2022-11-03 RX ADMIN — HYDROCODONE BITARTRATE AND ACETAMINOPHEN 1 TABLET: 5; 325 TABLET ORAL at 13:30

## 2022-11-03 RX ADMIN — ACETAMINOPHEN AND CODEINE PHOSPHATE 1 TABLET: 300; 30 TABLET ORAL at 09:06

## 2022-11-03 RX ADMIN — ONDANSETRON 4 MG: 4 TABLET, ORALLY DISINTEGRATING ORAL at 13:30

## 2022-11-03 NOTE — CONSULTS
[address]    Name:  John Montgomery  MR#:  [id number]  :  1955  ACCOUNT #:  [de-identified]  DATE OF SERVICE:  2022    REQUESTING PHYSICIAN:  Damien Cheung MD    CHIEF COMPLAINT:  C2 fracture after a fall. HISTORY OF PRESENT ILLNESS:  The patient has a history of rectal cancer. She was apparently sleeping. Her daughter tells me that the bed is rather high. She did not awaken but managed to fall out of bed perhaps while having a nightmare, struck the nightstand, complained of neck pain. The patient's daughter drove her here to the hospital.  She denies any weakness in upper or lower extremities or bowel or bladder deficits and imaging studies revealed medical fractures at C2 and a fracture partially through the vertebral body. PAST MEDICAL HISTORY:  She has a history of atrial fibrillation. She recently stopped her Eliquis after a GI bleed. She has had breast surgery, colonoscopy, pacemaker. SOCIAL HISTORY:  No alcohol use none. Nonsmoker. REVIEW OF SYSTEMS:  Otherwise noncontributory. She did receive radiation therapy for the rectal cancer and has had some low sacral pain as a result. PHYSICAL EXAMINATION:  She is awake and alert, oriented to person, place, and time. Speech is fluent. Moves all four extremities equally. Gait and station are deferred. Toes downgoing. No clonus at the ankles. Cranial nerve function II through XII normal.  Minimal tenderness to palpation in posterior cervical region. I reviewed the CT scan of the cervical spine as noted above. She also had a CT scan of the head which is a normal study. It does not sound like she had any kind of seizure activity but managed to fall out of the bed while she was sleeping. This is probably a hyperextension injury. The alignment though is perfectly normal of the cervical spine. I would elect to treat her in a hard cervical collar and follow her up in our office.   I went through some rules about the collar. It would be practically impossible for her to stay in the hard collar 100% of the time. There will be times when she will have to clean the cushions within the brace. At that point, she can take the collar off and not move her head or neck. I will see her back in the office in about 2 weeks in routine followup and will follow her with followup imaging studies. Thank you for asking me to see the patient.       Morgan Banks MD      [id number]  D:  11/03/2022 14:10  T:  11/03/2022 16:00  JOB #:  [id number]  CC:  Bryan Greer MD

## 2022-11-03 NOTE — ED TRIAGE NOTES
Patient arrives ambulatory from home. Patient states that she rolled out of the bed in her sleep this morning. Pt believes she hit her neck on the nightstand. Endorses right and left sided neck pain. Bruising noted to right forearm. No midline neck tenderness in triage.

## 2022-11-03 NOTE — DISCHARGE INSTRUCTIONS
- Keep the cervical collar on and follow up with Dr. Bobbi Nguyen in 2 weeks, call to schedule an appointment when you get home  - Return for new weakness or numbness, severe pain, loss of vision, difficulty walking, or any new worsening symptoms

## 2022-11-03 NOTE — CONSULTS
Reviewed the Ct C spine  fractures through the pedicles and partial body of C2  non displaced  Leave pt in hard collar  will call ER MD

## 2022-11-03 NOTE — ED PROVIDER NOTES
Chief Complaint   Patient presents with    Fall    Neck Pain     This is a 21-year-old female with a history of paroxysmal atrial fibrillation (Eliquis discontinued a week ago due to GIB), rectal cancer in remission, presenting with posterior neck pain after falling out of her bed this morning at 0600 and striking the back of her neck on the nightstand. She denies any headache or loss of consciousness, lateralizing weakness or sensory deficits, or difficulty walking. Has some bruising across her right forearm but no pain across her right wrist or hand. No preceding lightheadedness or dizziness, prior to today she was in her usual state of health. Denies any chest pain, shortness of breath, abdominal pain, vomiting, or any other systemic complaints. Symptoms are moderate in nature without alleviating factors. Review of Systems   Constitutional:  Negative for fever. HENT:  Negative for facial swelling. Eyes:  Negative for redness. Respiratory:  Negative for shortness of breath. Cardiovascular:  Negative for chest pain. Gastrointestinal:  Negative for abdominal pain and vomiting. Musculoskeletal:  Positive for neck pain. Skin:  Positive for color change. Neurological:  Negative for syncope. Psychiatric/Behavioral:  Negative for confusion. Past Medical History:   Diagnosis Date    Atrial fibrillation ([facility] [address]     recently diagnosed in 2021. Cancer [facility])     rectal cancer    Ill-defined condition     rectal bleeding. Thromboembolus ([facility] [address]     PE in 1999. Past Surgical History:   Procedure Laterality Date    COLONOSCOPY N/A 2/23/2021    COLONOSCOPY performed by Priyanka Laboy MD at [facility] [address] UNLISTED      breast biopsy.           Family History:   Problem Relation Age of Onset    Cancer Paternal Uncle     Cancer Mother     Cancer Father        Social History     Socioeconomic History    Marital status:      Spouse name: Not on file    Number of children: Not on file    Years of education: Not on file    Highest education level: Not on file   Occupational History    Not on file   Tobacco Use    Smoking status: Never    Smokeless tobacco: Never   Substance and Sexual Activity    Alcohol use: Never    Drug use: Never    Sexual activity: Not on file   Other Topics Concern    Not on file   Social History Narrative    Not on file     Social Determinants of Health     Financial Resource Strain: Not on file   Food Insecurity: Not on file   Transportation Needs: Not on file   Physical Activity: Not on file   Stress: Not on file   Social Connections: Not on file   Intimate Partner Violence: Not on file   Housing Stability: Not on file         ALLERGIES: Patient has no known allergies. Vitals:    11/03/22 0737   BP: 133/81   Pulse: (!) 110   Resp: 18   Temp: 97.7 °F (36.5 °C)   SpO2: 94%       Physical exam  General:  Awake and alert, NAD  HEENT:  NC/AT, equal pupils, moist mucous membranes  Neck:   Normal inspection, +tender across the upper paraspinal musculature along the cervical spine bilaterally, there is a step off present in the upper cervical spine  Cardiac:  +Tachycardic, regular rhythm, no murmurs  Respiratory:  Clear bilaterally, no wheezes, rales, rhonchi  Abdomen:  Soft and nontender, nondistended  Extremities: Warm and well perfused, no peripheral edema  Neuro:  Moving all extremities symmetrically without gross motor deficit  Skin:   No rashes, ecchymoses, or pallor    No results found for this or any previous visit (from the past 12 hour(s)). XR FOREARM RT AP/LAT    Result Date: 11/3/2022  No acute abnormality. CT HEAD WO CONT    Result Date: 11/3/2022  No acute abnormality. CT SPINE CERV WO CONT    Result Date: 11/3/2022  Fractures of the C2 pedicles bilaterally including a fracture extending into the right vertebral body. Nondisplaced horizontal fracture of the mid C2 vertebral body.  The findings were called to  Kyra on 11/3/2022 at 10:09 AM by myself. [phone]   Procedures - none unless documented below      ED course: Hard cervical collar applied at the time of my exam.  Imaging reviewed, [facility] who advised having her continue immobilization with a hard cervical collar and following up in the office in 2 weeks for repeat imaging, no indication for surgical intervention at this time. Thankfully her neurologic exam is nonfocal.  Pain now better controlled since she's received Norco.  HR normalized. Will discharge home under the care of her daughter. The patient has been given verbal and written advice regarding today's presentation including reasons to re-attend, specifically signs and symptoms of concern or worsening condition were discussed and understood by the patient.      Impression: C2 pedicle and vertebral body fractures  Disposition: Discharge home

## 2022-11-18 ENCOUNTER — TRANSCRIBE ORDER (OUTPATIENT)
Dept: SCHEDULING | Age: 67
End: 2022-11-18

## 2022-11-18 DIAGNOSIS — S12.9XXA CLOSED FRACTURE OF CERVICAL VERTEBRA WITHOUT SPINAL CORD INJURY (HCC): Primary | ICD-10-CM

## 2022-12-03 ENCOUNTER — HOSPITAL ENCOUNTER (OUTPATIENT)
Dept: CT IMAGING | Age: 67
End: 2022-12-03
Attending: NEUROLOGICAL SURGERY
Payer: MEDICARE

## 2022-12-03 DIAGNOSIS — S12.9XXA CLOSED FRACTURE OF CERVICAL VERTEBRA WITHOUT SPINAL CORD INJURY (HCC): ICD-10-CM

## 2022-12-03 PROCEDURE — 72125 CT NECK SPINE W/O DYE: CPT

## 2022-12-19 ENCOUNTER — TRANSCRIBE ORDER (OUTPATIENT)
Dept: SCHEDULING | Age: 67
End: 2022-12-19

## 2022-12-19 DIAGNOSIS — S12.9XXA FRACTURE OF CERVICAL VERTEBRA (HCC): Primary | ICD-10-CM

## 2022-12-31 ENCOUNTER — HOSPITAL ENCOUNTER (OUTPATIENT)
Dept: CT IMAGING | Age: 67
Discharge: HOME | End: 2022-12-31
Attending: NEUROLOGICAL SURGERY
Payer: MEDICARE

## 2022-12-31 DIAGNOSIS — S12.9XXA FRACTURE OF CERVICAL VERTEBRA (HCC): ICD-10-CM

## 2022-12-31 PROCEDURE — 72125 CT NECK SPINE W/O DYE: CPT

## 2024-03-16 ENCOUNTER — HOSPITAL ENCOUNTER (EMERGENCY)
Facility: HOSPITAL | Age: 69
Discharge: HOME | End: 2024-03-16
Attending: STUDENT IN AN ORGANIZED HEALTH CARE EDUCATION/TRAINING PROGRAM
Payer: MEDICARE

## 2024-03-16 ENCOUNTER — APPOINTMENT (OUTPATIENT)
Facility: HOSPITAL | Age: 69
End: 2024-03-16
Payer: MEDICARE

## 2024-03-16 VITALS
OXYGEN SATURATION: 95 % | TEMPERATURE: 98.5 F | WEIGHT: 168.43 LBS | HEART RATE: 77 BPM | RESPIRATION RATE: 18 BRPM | BODY MASS INDEX: 28.76 KG/M2 | DIASTOLIC BLOOD PRESSURE: 55 MMHG | HEIGHT: 64 IN | SYSTOLIC BLOOD PRESSURE: 122 MMHG

## 2024-03-16 DIAGNOSIS — J06.9 ACUTE UPPER RESPIRATORY INFECTION: Primary | ICD-10-CM

## 2024-03-16 LAB
ALBUMIN SERPL-MCNC: 3.5 G/DL (ref 3.5–5)
ALBUMIN/GLOB SERPL: 1.1 (ref 1.1–2.2)
ALP SERPL-CCNC: 103 U/L (ref 45–117)
ALT SERPL-CCNC: 24 U/L (ref 12–78)
ANION GAP SERPL CALC-SCNC: 7 MMOL/L (ref 5–15)
AST SERPL-CCNC: 23 U/L (ref 15–37)
BASOPHILS # BLD: 0 K/UL (ref 0–0.1)
BASOPHILS NFR BLD: 0 % (ref 0–1)
BILIRUB SERPL-MCNC: 0.7 MG/DL (ref 0.2–1)
BUN SERPL-MCNC: 11 MG/DL (ref 6–20)
BUN/CREAT SERPL: 15 (ref 12–20)
CALCIUM SERPL-MCNC: 8.3 MG/DL (ref 8.5–10.1)
CHLORIDE SERPL-SCNC: 105 MMOL/L (ref 97–108)
CO2 SERPL-SCNC: 21 MMOL/L (ref 21–32)
COMMENT:: NORMAL
CREAT SERPL-MCNC: 0.74 MG/DL (ref 0.55–1.02)
DIFFERENTIAL METHOD BLD: ABNORMAL
EOSINOPHIL # BLD: 0 K/UL (ref 0–0.4)
EOSINOPHIL NFR BLD: 1 % (ref 0–7)
ERYTHROCYTE [DISTWIDTH] IN BLOOD BY AUTOMATED COUNT: 14.4 % (ref 11.5–14.5)
GLOBULIN SER CALC-MCNC: 3.2 G/DL (ref 2–4)
GLUCOSE SERPL-MCNC: 104 MG/DL (ref 65–100)
HCT VFR BLD AUTO: 33.5 % (ref 35–47)
HGB BLD-MCNC: 11.2 G/DL (ref 11.5–16)
IMM GRANULOCYTES # BLD AUTO: 0 K/UL (ref 0–0.04)
IMM GRANULOCYTES NFR BLD AUTO: 0 % (ref 0–0.5)
LYMPHOCYTES # BLD: 0.5 K/UL (ref 0.8–3.5)
LYMPHOCYTES NFR BLD: 12 % (ref 12–49)
MCH RBC QN AUTO: 26.8 PG (ref 26–34)
MCHC RBC AUTO-ENTMCNC: 33.4 G/DL (ref 30–36.5)
MCV RBC AUTO: 80.1 FL (ref 80–99)
MONOCYTES # BLD: 0.4 K/UL (ref 0–1)
MONOCYTES NFR BLD: 9 % (ref 5–13)
NEUTS SEG # BLD: 3.6 K/UL (ref 1.8–8)
NEUTS SEG NFR BLD: 78 % (ref 32–75)
NRBC # BLD: 0 K/UL (ref 0–0.01)
NRBC BLD-RTO: 0 PER 100 WBC
NT PRO BNP: 421 PG/ML
PLATELET # BLD AUTO: 92 K/UL (ref 150–400)
PMV BLD AUTO: 11.6 FL (ref 8.9–12.9)
POTASSIUM SERPL-SCNC: 4 MMOL/L (ref 3.5–5.1)
PROT SERPL-MCNC: 6.7 G/DL (ref 6.4–8.2)
RBC # BLD AUTO: 4.18 M/UL (ref 3.8–5.2)
RBC MORPH BLD: ABNORMAL
SODIUM SERPL-SCNC: 133 MMOL/L (ref 136–145)
SPECIMEN HOLD: NORMAL
TROPONIN I SERPL HS-MCNC: 6 NG/L (ref 0–51)
WBC # BLD AUTO: 4.5 K/UL (ref 3.6–11)

## 2024-03-16 PROCEDURE — 71046 X-RAY EXAM CHEST 2 VIEWS: CPT

## 2024-03-16 PROCEDURE — 93005 ELECTROCARDIOGRAM TRACING: CPT | Performed by: NURSE PRACTITIONER

## 2024-03-16 PROCEDURE — 80053 COMPREHEN METABOLIC PANEL: CPT

## 2024-03-16 PROCEDURE — 99285 EMERGENCY DEPT VISIT HI MDM: CPT

## 2024-03-16 PROCEDURE — 84484 ASSAY OF TROPONIN QUANT: CPT

## 2024-03-16 PROCEDURE — 83880 ASSAY OF NATRIURETIC PEPTIDE: CPT

## 2024-03-16 PROCEDURE — 36415 COLL VENOUS BLD VENIPUNCTURE: CPT

## 2024-03-16 PROCEDURE — 85025 COMPLETE CBC W/AUTO DIFF WBC: CPT

## 2024-03-16 RX ORDER — AZITHROMYCIN 250 MG/1
TABLET, FILM COATED ORAL
Qty: 6 TABLET | Refills: 0 | Status: SHIPPED | OUTPATIENT
Start: 2024-03-16 | End: 2024-03-26

## 2024-03-16 ASSESSMENT — ENCOUNTER SYMPTOMS: COUGH: 1

## 2024-03-16 NOTE — ED NOTES
Patient discharged by UZAIR Carpio - pt sent to the front lobby, with strong and steady gait, no acute distress noted at time of discharge - Discharge information / home RX / and reasons to return to the ED were reviewed by the ED provider.

## 2024-03-16 NOTE — ED TRIAGE NOTES
Pt arrives from home for c/o cough with thick green phlegm and concern for pneumonia since Thursday. Referred by [facility] clinic, where she had negative flu and covid tests performed.    Hx cancer, intubation, afib    NP in triage

## 2024-03-16 NOTE — DISCHARGE INSTRUCTIONS
Lab results are reassuring (included in d/c paperwork). Chest x-ray is normal. Given longevity of symptoms, will place on antibiotics- please follow up with PCP next week.

## 2024-03-16 NOTE — ED PROVIDER NOTES
[facility] EMERGENCY DEP  EMERGENCY DEPARTMENT ENCOUNTER      Pt Name: Courtney Ashraf  MRN: [id number]  Birthdate 1955  Date of evaluation: 3/16/2024  Provider: TY Sutherland NP    CHIEF COMPLAINT       Chief Complaint   Patient presents with    Cough         HISTORY OF PRESENT ILLNESS   (Location/Symptom, Timing/Onset, Context/Setting, Quality, Duration, Modifying Factors, Severity)  Note limiting factors.   The history is provided by the patient and a relative. No  was used.     Courtney Ashraf is a 68 y.o. female with Hx of A fib, rectal CA, PE, cervical spine fracture, proctitis, constipation who presents ambulatory w/ family to [facility] ED with cc of cough.     Pt presents with family member 2/2 concern for productive cough since this past Thursday, states \"green blobs.\" Was seen at [facility] minute clinic and referred to ED for evaluation after (-) FLU/ COVID test 2/2 complicated Pmhx and concern for possible CAP. Pt reports that she had a low grade fever that started 2d ago. Pt also notes recent attempt for Watchman procedure 3w ago where she was intubated and ongoing sore throat. Procedure wasn't done because of complications.  Is being seen at [facility] for this, also sees Cardiothoracic surgery, Heme/ Onc, Cardiology at [facility].     Denies chills, N/V, congestion, CP, SOB, LE swelling, urinary concerns. Denies tobacco/ ETOH/ substance abuse.           PCP: Holly Jack MD    There are no other complaints, changes or physical findings at this time.      Review of External Medical Records:     Nursing Notes were reviewed.    REVIEW OF SYSTEMS    (2-9 systems for level 4, 10 or more for level 5)     Review of Systems   Respiratory:  Positive for cough.        Except as noted above the remainder of the review of systems was reviewed and negative.       PAST MEDICAL HISTORY     Past Medical History:   Diagnosis Date    Atrial fibrillation (HCC)     recently diagnosed in 2021.                CONSULTS:  None    PROCEDURES:  Unless otherwise noted below, none     Procedures      FINAL IMPRESSION      1. Acute upper respiratory infection          DISPOSITION/PLAN   DISPOSITION Decision To Discharge 03/16/2024 05:13:51 PM      PATIENT REFERRED TO:  Holly Jack MD  [address] [facility] [address] [phone]    Schedule an appointment as soon as possible for a visit       [facility] EMERGENCY DEP  [address] [place] [id number] [phone]  Go to   As needed, If symptoms worsen      DISCHARGE MEDICATIONS:  Discharge Medication List as of 3/16/2024  5:30 PM        START taking these medications    Details   azithromycin (ZITHROMAX) 250 MG tablet 500mg on day 1 followed by 250mg on days 2 - 5, Disp-6 tablet, R-0Normal               (Please note that portions of this note were completed with a voice recognition program.  Efforts were made to edit the dictations but occasionally words are mis-transcribed.)    TY Sutherland NP (electronically signed)  Emergency Attending Physician / Physician Assistant / Nurse Practitioner             Bernie Carpio APRN - NP  03/16/24 [id number]

## 2024-03-17 LAB
EKG ATRIAL RATE: 79 BPM
EKG DIAGNOSIS: NORMAL
EKG P AXIS: 60 DEGREES
EKG P-R INTERVAL: 160 MS
EKG Q-T INTERVAL: 382 MS
EKG QRS DURATION: 74 MS
EKG QTC CALCULATION (BAZETT): 438 MS
EKG R AXIS: 50 DEGREES
EKG T AXIS: -15 DEGREES
EKG VENTRICULAR RATE: 79 BPM

## 2024-03-17 PROCEDURE — 93010 ELECTROCARDIOGRAM REPORT: CPT | Performed by: INTERNAL MEDICINE

## (undated) DEVICE — Device

## (undated) DEVICE — 1200 GUARD II KIT W/5MM TUBE W/O VAC TUBE: Brand: GUARDIAN

## (undated) DEVICE — BAG BELONG PT PERS CLEAR HANDL

## (undated) DEVICE — SET GRAV CK VLV NEEDLESS ST 3 GANGED 4WAY STPCOCK HI FLO 10

## (undated) DEVICE — ELECTRODE,RADIOTRANSLUCENT,FOAM,3PK: Brand: MEDLINE

## (undated) DEVICE — 3M™ CUROS™ DISINFECTING CAP FOR NEEDLELESS CONNECTORS 270/CARTON 20 CARTONS/CASE CFF1-270: Brand: CUROS™

## (undated) DEVICE — SNARE ENDOSCP M L240CM W27MM SHTH DIA2.4MM CHN 2.8MM OVL

## (undated) DEVICE — FORCEPS BX L240CM JAW DIA2.8MM L CAP W/ NDL MIC MESH TOOTH

## (undated) DEVICE — KIT COLON W/ 1.1OZ LUB AND 2 END

## (undated) DEVICE — (D)SENSOR RMFG 02 PULS OXMTR -- DISC BY MFR USE ITEM 133445

## (undated) DEVICE — SIMPLICITY FLUFF UNDERPAD 23X36, MODERATE: Brand: SIMPLICITY

## (undated) DEVICE — POLYP TRAP: Brand: TRAPEASE®

## (undated) DEVICE — CONTAINER SPEC 20 ML LID NEUT BUFF FORMALIN 10 % POLYPR STS

## (undated) DEVICE — BAG SPEC BIOHZRD 10 X 10 IN --

## (undated) DEVICE — CATH IV AUTOGRD BC PNK 20GA 25 -- INSYTE

## (undated) DEVICE — CUFF BP ADLT SOFT 1 TUBE HP --

## (undated) DEVICE — CANN NASAL O2 CAPNOGRAPHY AD -- FILTERLINE

## (undated) DEVICE — JELLY,LUBE,STERILE,FLIP TOP,TUBE,4-OZ: Brand: MEDLINE

## (undated) DEVICE — SOLIDIFIER MEDC 1200ML -- CONVERT TO 356117